# Patient Record
Sex: MALE | Race: WHITE | NOT HISPANIC OR LATINO
[De-identification: names, ages, dates, MRNs, and addresses within clinical notes are randomized per-mention and may not be internally consistent; named-entity substitution may affect disease eponyms.]

---

## 2021-05-15 ENCOUNTER — TRANSCRIPTION ENCOUNTER (OUTPATIENT)
Age: 71
End: 2021-05-15

## 2023-12-12 ENCOUNTER — EMERGENCY (EMERGENCY)
Facility: HOSPITAL | Age: 73
LOS: 1 days | Discharge: HOME | End: 2023-12-12
Attending: EMERGENCY MEDICINE
Payer: MEDICARE

## 2023-12-12 VITALS
SYSTOLIC BLOOD PRESSURE: 198 MMHG | WEIGHT: 173.06 LBS | DIASTOLIC BLOOD PRESSURE: 109 MMHG | HEIGHT: 69 IN | RESPIRATION RATE: 17 BRPM | TEMPERATURE: 98 F | OXYGEN SATURATION: 97 % | HEART RATE: 81 BPM

## 2023-12-12 VITALS
SYSTOLIC BLOOD PRESSURE: 177 MMHG | OXYGEN SATURATION: 100 % | RESPIRATION RATE: 18 BRPM | DIASTOLIC BLOOD PRESSURE: 93 MMHG | HEART RATE: 83 BPM

## 2023-12-12 LAB
ALBUMIN SERPL ELPH-MCNC: 4.4 G/DL — SIGNIFICANT CHANGE UP (ref 3.3–5)
ALBUMIN SERPL ELPH-MCNC: 4.4 G/DL — SIGNIFICANT CHANGE UP (ref 3.3–5)
ALP SERPL-CCNC: 60 U/L — SIGNIFICANT CHANGE UP (ref 40–120)
ALP SERPL-CCNC: 60 U/L — SIGNIFICANT CHANGE UP (ref 40–120)
ALT FLD-CCNC: 16 U/L — SIGNIFICANT CHANGE UP (ref 10–45)
ALT FLD-CCNC: 16 U/L — SIGNIFICANT CHANGE UP (ref 10–45)
ANION GAP SERPL CALC-SCNC: 11 MMOL/L — SIGNIFICANT CHANGE UP (ref 5–17)
ANION GAP SERPL CALC-SCNC: 11 MMOL/L — SIGNIFICANT CHANGE UP (ref 5–17)
APTT BLD: 36.6 SEC — HIGH (ref 24.5–35.6)
APTT BLD: 36.6 SEC — HIGH (ref 24.5–35.6)
AST SERPL-CCNC: 22 U/L — SIGNIFICANT CHANGE UP (ref 10–40)
AST SERPL-CCNC: 22 U/L — SIGNIFICANT CHANGE UP (ref 10–40)
BASOPHILS # BLD AUTO: 0.03 K/UL — SIGNIFICANT CHANGE UP (ref 0–0.2)
BASOPHILS # BLD AUTO: 0.03 K/UL — SIGNIFICANT CHANGE UP (ref 0–0.2)
BASOPHILS NFR BLD AUTO: 0.5 % — SIGNIFICANT CHANGE UP (ref 0–2)
BASOPHILS NFR BLD AUTO: 0.5 % — SIGNIFICANT CHANGE UP (ref 0–2)
BILIRUB SERPL-MCNC: 1.1 MG/DL — SIGNIFICANT CHANGE UP (ref 0.2–1.2)
BILIRUB SERPL-MCNC: 1.1 MG/DL — SIGNIFICANT CHANGE UP (ref 0.2–1.2)
BUN SERPL-MCNC: 18 MG/DL — SIGNIFICANT CHANGE UP (ref 7–23)
BUN SERPL-MCNC: 18 MG/DL — SIGNIFICANT CHANGE UP (ref 7–23)
CALCIUM SERPL-MCNC: 9.5 MG/DL — SIGNIFICANT CHANGE UP (ref 8.4–10.5)
CALCIUM SERPL-MCNC: 9.5 MG/DL — SIGNIFICANT CHANGE UP (ref 8.4–10.5)
CHLORIDE SERPL-SCNC: 103 MMOL/L — SIGNIFICANT CHANGE UP (ref 96–108)
CHLORIDE SERPL-SCNC: 103 MMOL/L — SIGNIFICANT CHANGE UP (ref 96–108)
CO2 SERPL-SCNC: 27 MMOL/L — SIGNIFICANT CHANGE UP (ref 22–31)
CO2 SERPL-SCNC: 27 MMOL/L — SIGNIFICANT CHANGE UP (ref 22–31)
CREAT SERPL-MCNC: 0.84 MG/DL — SIGNIFICANT CHANGE UP (ref 0.5–1.3)
CREAT SERPL-MCNC: 0.84 MG/DL — SIGNIFICANT CHANGE UP (ref 0.5–1.3)
CRP SERPL-MCNC: <3 MG/L — SIGNIFICANT CHANGE UP (ref 0–4)
CRP SERPL-MCNC: <3 MG/L — SIGNIFICANT CHANGE UP (ref 0–4)
EGFR: 92 ML/MIN/1.73M2 — SIGNIFICANT CHANGE UP
EGFR: 92 ML/MIN/1.73M2 — SIGNIFICANT CHANGE UP
EOSINOPHIL # BLD AUTO: 0.08 K/UL — SIGNIFICANT CHANGE UP (ref 0–0.5)
EOSINOPHIL # BLD AUTO: 0.08 K/UL — SIGNIFICANT CHANGE UP (ref 0–0.5)
EOSINOPHIL NFR BLD AUTO: 1.3 % — SIGNIFICANT CHANGE UP (ref 0–6)
EOSINOPHIL NFR BLD AUTO: 1.3 % — SIGNIFICANT CHANGE UP (ref 0–6)
ERYTHROCYTE [SEDIMENTATION RATE] IN BLOOD: 32 MM/HR — HIGH (ref 0–20)
ERYTHROCYTE [SEDIMENTATION RATE] IN BLOOD: 32 MM/HR — HIGH (ref 0–20)
GLUCOSE SERPL-MCNC: 91 MG/DL — SIGNIFICANT CHANGE UP (ref 70–99)
GLUCOSE SERPL-MCNC: 91 MG/DL — SIGNIFICANT CHANGE UP (ref 70–99)
HCT VFR BLD CALC: 42.1 % — SIGNIFICANT CHANGE UP (ref 39–50)
HCT VFR BLD CALC: 42.1 % — SIGNIFICANT CHANGE UP (ref 39–50)
HGB BLD-MCNC: 14.1 G/DL — SIGNIFICANT CHANGE UP (ref 13–17)
HGB BLD-MCNC: 14.1 G/DL — SIGNIFICANT CHANGE UP (ref 13–17)
IMM GRANULOCYTES NFR BLD AUTO: 0.3 % — SIGNIFICANT CHANGE UP (ref 0–0.9)
IMM GRANULOCYTES NFR BLD AUTO: 0.3 % — SIGNIFICANT CHANGE UP (ref 0–0.9)
INR BLD: 2.05 RATIO — HIGH (ref 0.85–1.18)
INR BLD: 2.05 RATIO — HIGH (ref 0.85–1.18)
LYMPHOCYTES # BLD AUTO: 0.67 K/UL — LOW (ref 1–3.3)
LYMPHOCYTES # BLD AUTO: 0.67 K/UL — LOW (ref 1–3.3)
LYMPHOCYTES # BLD AUTO: 10.9 % — LOW (ref 13–44)
LYMPHOCYTES # BLD AUTO: 10.9 % — LOW (ref 13–44)
MCHC RBC-ENTMCNC: 31.1 PG — SIGNIFICANT CHANGE UP (ref 27–34)
MCHC RBC-ENTMCNC: 31.1 PG — SIGNIFICANT CHANGE UP (ref 27–34)
MCHC RBC-ENTMCNC: 33.5 GM/DL — SIGNIFICANT CHANGE UP (ref 32–36)
MCHC RBC-ENTMCNC: 33.5 GM/DL — SIGNIFICANT CHANGE UP (ref 32–36)
MCV RBC AUTO: 92.7 FL — SIGNIFICANT CHANGE UP (ref 80–100)
MCV RBC AUTO: 92.7 FL — SIGNIFICANT CHANGE UP (ref 80–100)
MONOCYTES # BLD AUTO: 0.42 K/UL — SIGNIFICANT CHANGE UP (ref 0–0.9)
MONOCYTES # BLD AUTO: 0.42 K/UL — SIGNIFICANT CHANGE UP (ref 0–0.9)
MONOCYTES NFR BLD AUTO: 6.9 % — SIGNIFICANT CHANGE UP (ref 2–14)
MONOCYTES NFR BLD AUTO: 6.9 % — SIGNIFICANT CHANGE UP (ref 2–14)
NEUTROPHILS # BLD AUTO: 4.91 K/UL — SIGNIFICANT CHANGE UP (ref 1.8–7.4)
NEUTROPHILS # BLD AUTO: 4.91 K/UL — SIGNIFICANT CHANGE UP (ref 1.8–7.4)
NEUTROPHILS NFR BLD AUTO: 80.1 % — HIGH (ref 43–77)
NEUTROPHILS NFR BLD AUTO: 80.1 % — HIGH (ref 43–77)
NRBC # BLD: 0 /100 WBCS — SIGNIFICANT CHANGE UP (ref 0–0)
NRBC # BLD: 0 /100 WBCS — SIGNIFICANT CHANGE UP (ref 0–0)
PLATELET # BLD AUTO: 249 K/UL — SIGNIFICANT CHANGE UP (ref 150–400)
PLATELET # BLD AUTO: 249 K/UL — SIGNIFICANT CHANGE UP (ref 150–400)
POTASSIUM SERPL-MCNC: 4.3 MMOL/L — SIGNIFICANT CHANGE UP (ref 3.5–5.3)
POTASSIUM SERPL-MCNC: 4.3 MMOL/L — SIGNIFICANT CHANGE UP (ref 3.5–5.3)
POTASSIUM SERPL-SCNC: 4.3 MMOL/L — SIGNIFICANT CHANGE UP (ref 3.5–5.3)
POTASSIUM SERPL-SCNC: 4.3 MMOL/L — SIGNIFICANT CHANGE UP (ref 3.5–5.3)
PROT SERPL-MCNC: 7.3 G/DL — SIGNIFICANT CHANGE UP (ref 6–8.3)
PROT SERPL-MCNC: 7.3 G/DL — SIGNIFICANT CHANGE UP (ref 6–8.3)
PROTHROM AB SERPL-ACNC: 21.1 SEC — HIGH (ref 9.5–13)
PROTHROM AB SERPL-ACNC: 21.1 SEC — HIGH (ref 9.5–13)
RBC # BLD: 4.54 M/UL — SIGNIFICANT CHANGE UP (ref 4.2–5.8)
RBC # BLD: 4.54 M/UL — SIGNIFICANT CHANGE UP (ref 4.2–5.8)
RBC # FLD: 13.2 % — SIGNIFICANT CHANGE UP (ref 10.3–14.5)
RBC # FLD: 13.2 % — SIGNIFICANT CHANGE UP (ref 10.3–14.5)
SODIUM SERPL-SCNC: 141 MMOL/L — SIGNIFICANT CHANGE UP (ref 135–145)
SODIUM SERPL-SCNC: 141 MMOL/L — SIGNIFICANT CHANGE UP (ref 135–145)
WBC # BLD: 6.13 K/UL — SIGNIFICANT CHANGE UP (ref 3.8–10.5)
WBC # BLD: 6.13 K/UL — SIGNIFICANT CHANGE UP (ref 3.8–10.5)
WBC # FLD AUTO: 6.13 K/UL — SIGNIFICANT CHANGE UP (ref 3.8–10.5)
WBC # FLD AUTO: 6.13 K/UL — SIGNIFICANT CHANGE UP (ref 3.8–10.5)

## 2023-12-12 PROCEDURE — 73610 X-RAY EXAM OF ANKLE: CPT | Mod: 26,RT

## 2023-12-12 PROCEDURE — 85730 THROMBOPLASTIN TIME PARTIAL: CPT

## 2023-12-12 PROCEDURE — 73630 X-RAY EXAM OF FOOT: CPT | Mod: 26,RT

## 2023-12-12 PROCEDURE — 85652 RBC SED RATE AUTOMATED: CPT

## 2023-12-12 PROCEDURE — 85610 PROTHROMBIN TIME: CPT

## 2023-12-12 PROCEDURE — 73610 X-RAY EXAM OF ANKLE: CPT

## 2023-12-12 PROCEDURE — 80053 COMPREHEN METABOLIC PANEL: CPT

## 2023-12-12 PROCEDURE — 85025 COMPLETE CBC W/AUTO DIFF WBC: CPT

## 2023-12-12 PROCEDURE — 73630 X-RAY EXAM OF FOOT: CPT

## 2023-12-12 PROCEDURE — 86140 C-REACTIVE PROTEIN: CPT

## 2023-12-12 PROCEDURE — 36415 COLL VENOUS BLD VENIPUNCTURE: CPT

## 2023-12-12 PROCEDURE — 99284 EMERGENCY DEPT VISIT MOD MDM: CPT | Mod: 25

## 2023-12-12 PROCEDURE — 99284 EMERGENCY DEPT VISIT MOD MDM: CPT | Mod: GC

## 2023-12-12 RX ORDER — MUPIROCIN 20 MG/G
1 OINTMENT TOPICAL
Refills: 0
Start: 2023-12-12

## 2023-12-12 RX ORDER — MUPIROCIN 20 MG/G
1 OINTMENT TOPICAL
Qty: 1 | Refills: 0
Start: 2023-12-12 | End: 2023-12-21

## 2023-12-12 RX ORDER — MUPIROCIN 20 MG/G
1 OINTMENT TOPICAL ONCE
Refills: 0 | Status: COMPLETED | OUTPATIENT
Start: 2023-12-12 | End: 2023-12-12

## 2023-12-12 RX ADMIN — MUPIROCIN 1 APPLICATION(S): 20 OINTMENT TOPICAL at 17:00

## 2023-12-12 NOTE — ED ADULT NURSE NOTE - OBJECTIVE STATEMENT
Pt is a 73y M, AxO3, PMH DVT, presents to ED for R LE wound. Pt states he first noted leaking wound in August, and has been following up outpatient. On weds, pt saw outpatient MD because wound was oozing yellow serous fluid, but in the last 4 days wound has been draining increasingly more fluid that is clear with a foul odor. He did not take the outpatient prescribed antibiotics, and came to ED for further eval.  Endorsing intermittent tingling and pain. Leg is dusky and scaled up to calf level, wound is localized to medial heel and ankle, moistened with clear fluid. Motor and sensation to toes and foot intact, area is warm, pedal pulses present. Breathing spontaneous and unlabored. Denies SOB, chest pain,  symptoms, N/V. Well appearing, ambulatory at bedside, VSS, no acute distress. Pt is a 73y M, AxO3, PMH R LE DVT on warfarin, presents to ED for R LE wound. Pt states he first noted leaking wound in August, and has been following up outpatient. On weds, pt saw outpatient MD because wound was oozing yellow serous fluid, but in the last 4 days wound has been draining increasingly more fluid that is clear with a foul odor. He did not take the outpatient prescribed antibiotics, and came to ED for further eval.  Endorsing intermittent tingling and pain. Leg is dusky and scaled up to calf level, wound is localized to medial heel and ankle, moistened with clear fluid. Motor and sensation to toes and foot intact, area is warm, pedal pulses present. Breathing spontaneous and unlabored. Denies fevers, chills, SOB, chest pain,  symptoms, N/V. Well appearing, ambulatory at bedside, VSS, no acute distress.

## 2023-12-12 NOTE — ED ADULT NURSE NOTE - NSFALLUNIVINTERV_ED_ALL_ED
Bed/Stretcher in lowest position, wheels locked, appropriate side rails in place/Call bell, personal items and telephone in reach/Instruct patient to call for assistance before getting out of bed/chair/stretcher/Non-slip footwear applied when patient is off stretcher/[place] to call system/Physically safe environment - no spills, clutter or unnecessary equipment/Purposeful proactive rounding/Room/bathroom lighting operational, light cord in reach Bed/Stretcher in lowest position, wheels locked, appropriate side rails in place/Call bell, personal items and telephone in reach/Instruct patient to call for assistance before getting out of bed/chair/stretcher/Non-slip footwear applied when patient is off stretcher/[place] to call system/Physically safe environment - no spills, clutter or unnecessary equipment/Purposeful proactive rounding/Room/bathroom lighting operational, light cord in reach

## 2023-12-12 NOTE — ED PROVIDER NOTE - OBJECTIVE STATEMENT
73-year-old male present to the emergency department due to nonhealing wound on right leg since August.  The patient states that started on the inner aspect of the foot and has progressed to the point where now it is oozing yellow watery discharge that is a foul odor.  He states that the wheezing started this past Wednesday, and the discharge has become heavier since Friday.  He went to his primary care doctor last week who prescribed him doxycycline, but he did not take the medication.  Patient has been wrapping it and keeping it clean since it is started, denies any purulent discharge or bleeding.  Patient denies any numbness to the foot, inability to walk, pain to the area, fevers, chills, body aches, history of diabetes, history of peripheral vascular disease, history of smoking. 73-year-old male present to the emergency department due to nonhealing wound on right leg since August.  The patient states that started on the inner aspect of the foot and has progressed to the point where now it is oozing yellow watery discharge that is a foul odor.  He states that the oozing started this past Wednesday, and the discharge has become heavier since Friday.  He went to his primary care doctor last week who prescribed him doxycycline, but he did not take the medication.  Patient has been wrapping it and keeping it clean since it is started, denies any purulent discharge or bleeding.  Patient denies any numbness to the foot, inability to walk, pain to the area, fevers, chills, body aches, history of diabetes, history of peripheral vascular disease, history of smoking.

## 2023-12-12 NOTE — ED PROVIDER NOTE - PROGRESS NOTE DETAILS
I spoke with the patient and discussed with him the recommendations from podiatry.  He agreed to follow-up with his primary care doctor in 3 days for repeat INR due to being given doxycycline, and he agreed to follow-up with podiatry in 1 week for further evaluation.  I instructed him to take the doxycycline twice a day for 10 days, and I reinforced instructions for podiatry to change the bandages and clean the area with mupirocin and Betadine daily.  I gave the patient instructions to return to the ED if symptoms worsen or develop symptoms such as fevers, chills, body aches, cough, chest pain.    Sal Perez MD (PGY 1)

## 2023-12-12 NOTE — CONSULT NOTE ADULT - SUBJECTIVE AND OBJECTIVE BOX
Chief Complaint: wound penetrating and wound leaking.    · Chief Complaint: The patient is a 73y Male complaining of wound penetrating.  · HPI Objective Statement: 73-year-old male present to the emergency department due to nonhealing wound on right leg since August.  The patient states that started on the inner aspect of the foot and has progressed to the point where now it is oozing yellow watery discharge that is a foul odor.  He states that the oozing started this past Wednesday, and the discharge has become heavier since Friday.  He went to his primary care doctor last week who prescribed him doxycycline, but he did not take the medication.  Patient has been wrapping it and keeping it clean since it is started, denies any purulent discharge or bleeding.  Patient denies any numbness to the foot, inability to walk, pain to the area, fevers, chills, body aches, history of diabetes, history of peripheral vascular disease, history of smoking.    PAST MEDICAL & SURGICAL HISTORY:  DVT (deep venous thrombosis)      Cataract          MEDICATIONS  (STANDING):    MEDICATIONS  (PRN):      Allergies    No Known Allergies    Intolerances        VITALS:    Vital Signs Last 24 Hrs  T(C): 36.4 (12 Dec 2023 11:12), Max: 36.4 (12 Dec 2023 11:12)  T(F): 97.6 (12 Dec 2023 11:12), Max: 97.6 (12 Dec 2023 11:12)  HR: 83 (12 Dec 2023 12:10) (81 - 83)  BP: 177/93 (12 Dec 2023 12:10) (177/93 - 198/109)  BP(mean): --  RR: 18 (12 Dec 2023 12:10) (17 - 18)  SpO2: 100% (12 Dec 2023 12:10) (97% - 100%)    Parameters below as of 12 Dec 2023 12:10  Patient On (Oxygen Delivery Method): room air        LABS:                          14.1   6.13  )-----------( 249      ( 12 Dec 2023 13:09 )             42.1       12-12    141  |  103  |  18  ----------------------------<  91  4.3   |  27  |  0.84    Ca    9.5      12 Dec 2023 13:09    TPro  7.3  /  Alb  4.4  /  TBili  1.1  /  DBili  x   /  AST  22  /  ALT  16  /  AlkPhos  60  12-12      CAPILLARY BLOOD GLUCOSE              LOWER EXTREMITY PHYSICAL EXAM:    Vascular: DP/PT 2/4, B/L, CFT <3 seconds B/L, Temperature gradient warm to warm R, warm to cool L.   Neuro: Epicritic sensation diminished to the level of digits, B/L.  Musculoskeletal/Ortho: unremarkable  Skin: b/l lower extremity skin pigmentation, extend from dorsolateral foot to the proximal 1/3 leg, R lower extremity 3+ pitting edema extend from toes to the level of midcalf, R medial heel wound to dermis, with periwound erythema and skin maceration, mild malodor with serous fluid oozing. L foot no open wounds, no acute signs of infection.    RADIOLOGY & ADDITIONAL STUDIES:  < from: Xray Ankle Complete 3 Views, Right (12.12.23 @ 13:10) >    ACC: [id number] EXAM:  XR ANKLE COMP MIN 3 VIEWS RT   ORDERED BY:  PELON POTTER     ACC: [id number] EXAM:  XR FOOT COMP MIN 3 VIEWS RT   ORDERED BY:  PELON POTTER     PROCEDURE DATE:  12/12/2023          INTERPRETATION:  XR FOOT COMPLETE 3 VIEWS RIGHT, XR ANKLE COMPLETE 3   VIEWS RIGHT    HISTORY: Cellulitis. Concern for osteolysis.    VIEWS: 3 views each  IMAGES: 6    COMPARISON: Right ankle x-rays dated 9/27/2016.    FINDINGS:    OSSEOUS STRUCTURES    Fractures:  None.    JOINTS    Joint Space(s):  There is moderate narrowing of the 1st   metatarsal-phalangeal joint with small to moderate-sized osteophytes.    OTHER FINDINGS:  Moderate atherosclerotic ossifications are present.   Subcutaneous edema is noted. No gas is seen. Plantar and retrocalcaneal   enthesophytes are noted.    IMPRESSION:  1.  No osseous destruction or soft tissue gas is seen.    --- End of Report ---            SUDHIR MORENO MD; Attending Radiologist  This document has been electronically signed. Dec 12 2023  1:44PM    < end of copied text >     Chief Complaint: wound penetrating and wound leaking.    · Chief Complaint: The patient is a 73y Male complaining of wound penetrating.  · HPI Objective Statement: 73-year-old male present to the emergency department due to nonhealing wound on right leg since August.  The patient states that started on the inner aspect of the foot and has progressed to the point where now it is oozing yellow watery discharge that is a foul odor.  He states that the oozing started this past Wednesday, and the discharge has become heavier since Friday.  He went to his primary care doctor last week who prescribed him doxycycline, but he did not take the medication.  Patient has been wrapping it and keeping it clean since it is started, denies any purulent discharge or bleeding.  Patient denies any numbness to the foot, inability to walk, pain to the area, fevers, chills, body aches, history of diabetes, history of peripheral vascular disease, history of smoking.    PAST MEDICAL & SURGICAL HISTORY:  DVT (deep venous thrombosis)      Cataract          MEDICATIONS  (STANDING):    MEDICATIONS  (PRN):      Allergies    No Known Allergies    Intolerances        VITALS:    Vital Signs Last 24 Hrs  T(C): 36.4 (12 Dec 2023 11:12), Max: 36.4 (12 Dec 2023 11:12)  T(F): 97.6 (12 Dec 2023 11:12), Max: 97.6 (12 Dec 2023 11:12)  HR: 83 (12 Dec 2023 12:10) (81 - 83)  BP: 177/93 (12 Dec 2023 12:10) (177/93 - 198/109)  BP(mean): --  RR: 18 (12 Dec 2023 12:10) (17 - 18)  SpO2: 100% (12 Dec 2023 12:10) (97% - 100%)    Parameters below as of 12 Dec 2023 12:10  Patient On (Oxygen Delivery Method): room air        LABS:                          14.1   6.13  )-----------( 249      ( 12 Dec 2023 13:09 )             42.1       12-12    141  |  103  |  18  ----------------------------<  91  4.3   |  27  |  0.84    Ca    9.5      12 Dec 2023 13:09    TPro  7.3  /  Alb  4.4  /  TBili  1.1  /  DBili  x   /  AST  22  /  ALT  16  /  AlkPhos  60  12-12      CAPILLARY BLOOD GLUCOSE              LOWER EXTREMITY PHYSICAL EXAM:    Vascular: DP/PT 2/4, B/L, CFT <3 seconds B/L, Temperature gradient warm to warm R, warm to cool L.   Neuro: Epicritic sensation diminished to the level of digits, B/L.  Musculoskeletal/Ortho: unremarkable  Skin: b/l lower extremity skin pigmentation, extend from dorsolateral foot to the proximal 1/3 leg, R lower extremity 3+ pitting edema extend from toes to the level of midcalf, R medial heel wound to dermis, with periwound erythema and skin maceration, mild malodor with serous fluid oozing. L foot no open wounds, no acute signs of infection.    RADIOLOGY & ADDITIONAL STUDIES:  < from: Xray Ankle Complete 3 Views, Right (12.12.23 @ 13:10) >    ACC: [id number] EXAM:  XR ANKLE COMP MIN 3 VIEWS RT   ORDERED BY:  PELON POTTER     ACC: [id number] EXAM:  XR FOOT COMP MIN 3 VIEWS RT   ORDERED BY:  PELON POTTER     PROCEDURE DATE:  12/12/2023          INTERPRETATION:  XR FOOT COMPLETE 3 VIEWS RIGHT, XR ANKLE COMPLETE 3   VIEWS RIGHT    HISTORY: Cellulitis. Concern for osteolysis.    VIEWS: 3 views each  IMAGES: 6    COMPARISON: Right ankle x-rays dated 9/27/2016.    FINDINGS:    OSSEOUS STRUCTURES    Fractures:  None.    JOINTS    Joint Space(s):  There is moderate narrowing of the 1st   metatarsal-phalangeal joint with small to moderate-sized osteophytes.    OTHER FINDINGS:  Moderate atherosclerotic ossifications are present.   Subcutaneous edema is noted. No gas is seen. Plantar and retrocalcaneal   enthesophytes are noted.    IMPRESSION:  1.  No osseous destruction or soft tissue gas is seen.    --- End of Report ---            SUDHIR MORENO MD; Attending Radiologist  This document has been electronically signed. Dec 12 2023  1:44PM    < end of copied text >

## 2023-12-12 NOTE — ED PROVIDER NOTE - PATIENT PORTAL LINK FT
You can access the FollowMyHealth Patient Portal offered by [facility] by registering at the following website: [email or web address]. By joining [employer]’s FollowMyHealth portal, you will also be able to view your health information using other applications (apps) compatible with our system. You can access the FollowMyHealth Patient Portal offered by [facility] by registering at the following website: [email or web address]. By joining [employer]’s FollowMyHealth portal, you will also be able to view your health information using other applications (apps) compatible with our system.

## 2023-12-12 NOTE — ED PROVIDER NOTE - ATTENDING CONTRIBUTION TO CARE
This is a 78-year-old fellow with right-sided foot redness and oozing.  He was seen by his regular doctor who sent him to the hospital.  No fevers or chills.  No trauma or injury recently however few months ago he did have a trauma.  His doctor prescribed antibiotics but he chose not to use it.  He has a history of DVT although he is taking Coumadin at this time.  2+ dorsalis pedis pulse.  The second toe is red and slightly warm with no pain to touch.  Normal sensation.  The medial aspect from the mid ankle into the arch is red warm with raw skin and minimal tenderness.  There is no induration or fluctuance.  I am unclear if the wound that he has on the medial aspect is cellulitic or simply stasis related.  The second toe does appear to have cellulitis.  At this time will do x-ray and consult podiatry for further evaluation and treatment.  CBC CMP reassess the patient.  Patient likely will require antibiotics either IV or p.o.

## 2023-12-12 NOTE — ED PROVIDER NOTE - NSFOLLOWUPINSTRUCTIONS_ED_ALL_ED_FT
Cellulitis    Cellulitis is a skin infection caused by bacteria. This condition occurs most often in the arms and lower legs but can occur anywhere over the body. Symptoms include redness, swelling, warm skin, tenderness, and chills/fever. If you were prescribed an antibiotic medicine, take it as told by your health care provider. Do not stop taking the antibiotic even if you start to feel better.    SEEK IMMEDIATE MEDICAL CARE IF YOU HAVE ANY OF THE FOLLOWING SYMPTOMS: worsening fever, red streaks coming from affected area, vomiting or diarrhea, or dizziness/lightheadedness.      - Recommended doxycyline for 10 days  - weight bearing as tolerated in a surgical shoe  - Recommended daily dressing change with Mupirocin cream followed by 4x4 gauze and ace banadage  - Pt instructed to return to ED immediately if erythema of the right lower extremity persists or worsen.  - Please call [phone] within a week to make an appointment with Dr. Casillas   - Follow up with your PCP in 3 days for repeat INR Cellulitis    Cellulitis is a skin infection caused by bacteria. This condition occurs most often in the arms and lower legs but can occur anywhere over the body. Symptoms include redness, swelling, warm skin, tenderness, and chills/fever. If you were prescribed an antibiotic medicine, take it as told by your health care provider. Do not stop taking the antibiotic even if you start to feel better.    SEEK IMMEDIATE MEDICAL CARE IF YOU HAVE ANY OF THE FOLLOWING SYMPTOMS: worsening fever, red streaks coming from affected area, vomiting or diarrhea, or dizziness/lightheadedness.      - Recommended doxycyline for 10 days  - weight bearing as tolerated in a surgical shoe  - Recommended daily dressing change with Mupirocin cream followed by 4x4 gauze and ace banadage  - Pt instructed to return to ED immediately if erythema of the right lower extremity persists or worsen.  - Please call [phone] within a week to make an appointment with Dr. Casillas   - Follow up with your PCP in 3 days for repeat INR

## 2023-12-12 NOTE — ED PROVIDER NOTE - CLINICAL SUMMARY MEDICAL DECISION MAKING FREE TEXT BOX
73-year-old male past medical history of DVT presenting to emergency department due to nonhealing wounds on right foot.  Wounds are located on medial aspect of right foot and on the second digit of the right toe.  Patient states the wound started in August and as of the past week the discharge has worsened.  He was offered doxycycline but did not take its.  Patient denies any fevers, chills, body aches, pain to area, numbness or weakness to area.  Patient is able to walk on the leg.  On exam there are 2 nonhealing wounds 1 on the medial aspect of the right foot and 1 on the second digit of the right foot.  Serous discharge is present with no purulent or sanguinous discharge present.  Surrounding area is dark in coloration patient has complete sensation and movement of foot with equal pulses to the lower extremities bilaterally.  Will order CBC, CMP, PT/PTT/INR, ESR, CRP and x-ray right foot to evaluate for any signs of infection or osteomyelitis.  Will consult podiatry.

## 2023-12-12 NOTE — CONSULT NOTE ADULT - ASSESSMENT
72 yo M presented with R lower extremity venous stasis with superimposed celluitis  - Pt seen and evaluated.  - Afebrile, WBC 6.13, ESR ordered, CRP <0.3  - b/l lower extremity skin pigmentation, extend from dorsolateral foot to the proximal 1/3 leg, R lower extremity 3+ pitting edema extend from toes to the level of midcalf, R medial heel wound to dermis, with periwound erythema and skin maceration, mild malodor with serous fluid oozing. L foot no open wounds, no acute signs of infection.  - RLE xr: no OM, no gas  - Right foot wound culture not obtained 2/2 absence of culturable material  - Applied betadine followed by 4x4 gauze, rubio and ace banadage  - Recommended doxycyline for 10 days  - weight bearing as tolerated in a surgical shoe  - Recommended daily dressing change with Mupirocin cream followed by 4x4 gauze and ace banadage  - Pt instructed to return to ED immediately if erythema of the right lower extremity persists or worsen.  - Please call [phone] within a week to make an appointment with Dr. Casillas   - Discussed with Attending  74 yo M presented with R lower extremity venous stasis with superimposed celluitis  - Pt seen and evaluated.  - Afebrile, WBC 6.13, ESR ordered, CRP <0.3  - b/l lower extremity skin pigmentation, extend from dorsolateral foot to the proximal 1/3 leg, R lower extremity 3+ pitting edema extend from toes to the level of midcalf, R medial heel wound to dermis, with periwound erythema and skin maceration, mild malodor with serous fluid oozing. L foot no open wounds, no acute signs of infection.  - RLE xr: no OM, no gas  - Right foot wound culture not obtained 2/2 absence of culturable material  - Applied betadine followed by 4x4 gauze, rubio and ace banadage  - Recommended doxycyline for 10 days  - weight bearing as tolerated in a surgical shoe  - Recommended daily dressing change with Mupirocin cream followed by 4x4 gauze and ace banadage  - Pt instructed to return to ED immediately if erythema of the right lower extremity persists or worsen.  - Please call [phone] within a week to make an appointment with Dr. Casillas   - Discussed with Attending

## 2024-01-05 ENCOUNTER — APPOINTMENT (OUTPATIENT)
Dept: VASCULAR SURGERY | Facility: CLINIC | Age: 74
End: 2024-01-05
Payer: MEDICARE

## 2024-01-05 VITALS
HEIGHT: 69 IN | TEMPERATURE: 98 F | BODY MASS INDEX: 25.62 KG/M2 | WEIGHT: 173 LBS | DIASTOLIC BLOOD PRESSURE: 101 MMHG | SYSTOLIC BLOOD PRESSURE: 178 MMHG | HEART RATE: 75 BPM

## 2024-01-05 DIAGNOSIS — I82.401 ACUTE EMBOLISM AND THROMBOSIS OF UNSPECIFIED DEEP VEINS OF RIGHT LOWER EXTREMITY: ICD-10-CM

## 2024-01-05 PROCEDURE — 93922 UPR/L XTREMITY ART 2 LEVELS: CPT

## 2024-01-05 PROCEDURE — 99204 OFFICE O/P NEW MOD 45 MIN: CPT | Mod: 25

## 2024-01-05 PROCEDURE — 29580 STRAPPING UNNA BOOT: CPT | Mod: RT

## 2024-01-05 PROCEDURE — 93971 EXTREMITY STUDY: CPT | Mod: RT

## 2024-01-05 RX ORDER — ACETAMINOPHEN 325 MG
TABLET ORAL
Refills: 0 | Status: ACTIVE | COMMUNITY

## 2024-01-05 RX ORDER — MULTIVIT-MIN/IRON/FOLIC ACID/K 18-600-40
CAPSULE ORAL
Refills: 0 | Status: ACTIVE | COMMUNITY

## 2024-01-08 NOTE — ASSESSMENT
[FreeTextEntry1] : 73M history RLE DVT on warfarin presenting with progressive R pain, erythema, and edema refractory to local wound care received thus far. Duplex in office shows chronic RLE DVT. RONY's noncompressible with good waveforms.   Plan for [place] wound care. Follow up in office on Tuesday 1/9 for local wound care.

## 2024-01-08 NOTE — PHYSICAL EXAM
[Normal Rate and Rhythm] : normal rate and rhythm [2+] : left 2+ [Ankle Swelling On The Right] : of the right ankle [] : of the right leg [de-identified] : well appearing [FreeTextEntry1] : palpable femoral areteries bilaterally, R PT/DP not palpable due to significant edema, R erythema and edema of first and second toes, and heel, venous stasis dermatitis of right medial leg, LLE unremarkable no wounds no skin changes no edema [de-identified] : sensation and motor grossly intact in all extremities

## 2024-01-08 NOTE — END OF VISIT
[] : Resident [FreeTextEntry3] : non invasives appear show adequate arterial perfusion. pt w venous hypertension, chronic dvt, post thrombotic syndrome  medial ankle cleansed, unna, foam, compression applied [Time Spent: ___ minutes] : I have spent [unfilled] minutes of time on the encounter.

## 2024-01-08 NOTE — HISTORY OF PRESENT ILLNESS
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 He reports remote history of trauma to right heel which required local wound care by his podiatrist which eventually resolved, and similar history of right toe wound requiring local wound care by podiatry which eventually resolved. Patient now presents with right foot pain, erythema, and swelling. This first began back in June of this year at which point he noticed right foot pain while gardening, associated with weeping edema from R foot/ankle. Since then he has been intermittently seen by podiatry, completed a course of mupirocin/doxycycline, and has had intermittent local wound care. He was last seen by Podiatry two weeks ago at which point local wound care was provided and he was referred to Vascular surgery. The pain is localized to the R foot and ankle, with no aggravating or alleviating factors. He denies fevers, chills. He previously wore compression stockings but is now unable to due to the extent of the edema.

## 2024-01-26 ENCOUNTER — APPOINTMENT (OUTPATIENT)
Dept: VASCULAR SURGERY | Facility: CLINIC | Age: 74
End: 2024-01-26
Payer: MEDICARE

## 2024-01-26 VITALS
SYSTOLIC BLOOD PRESSURE: 152 MMHG | TEMPERATURE: 98.1 F | HEART RATE: 69 BPM | WEIGHT: 173 LBS | BODY MASS INDEX: 25.62 KG/M2 | HEIGHT: 69 IN | DIASTOLIC BLOOD PRESSURE: 74 MMHG

## 2024-01-26 PROCEDURE — 29580 STRAPPING UNNA BOOT: CPT | Mod: RT

## 2024-01-26 NOTE — HISTORY OF PRESENT ILLNESS
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 He reports remote history of trauma to right heel which required local wound care by his podiatrist which eventually resolved, and similar history of right toe wound requiring local wound care by podiatry which eventually resolved. Patient now presents with right foot pain, erythema, and swelling. This first began back in June of this year at which point he noticed right foot pain while gardening, associated with weeping edema from R foot/ankle. Since then he has been intermittently seen by podiatry, completed a course of mupirocin/doxycycline, and has had intermittent local wound care. He was last seen by Podiatry two weeks ago at which point local wound care was provided and he was referred to Vascular surgery. The pain is localized to the R foot and ankle, with no aggravating or alleviating factors. He denies fevers, chills. He previously wore compression stockings but is now unable to due to the extent of the edema.  [de-identified] : 1/26/24: pt for fu.  he had his bandage changed at podiatry earlier in the week.  pt notices improvement

## 2024-01-26 NOTE — PHYSICAL EXAM
[de-identified] : well appearing.   Cardiovascular: normal rate and rhythm. Femoral: right 2+ and left 2+. Palpable femoral areteries bilaterally, R PT/DP not palpable due to significant edema, R erythema and edema of first and second toes, and heel, venous stasis dermatitis of right medial leg, LLE unremarkable no wounds no skin changes no edema.   ankle edema of the right ankle   venous stasis of the right leg   Musculoskeletal: sensation and motor grossly intact in all extremities.

## 2024-02-02 ENCOUNTER — APPOINTMENT (OUTPATIENT)
Dept: VASCULAR SURGERY | Facility: CLINIC | Age: 74
End: 2024-02-02
Payer: MEDICARE

## 2024-02-02 VITALS
HEIGHT: 69 IN | HEART RATE: 68 BPM | TEMPERATURE: 97.6 F | DIASTOLIC BLOOD PRESSURE: 81 MMHG | SYSTOLIC BLOOD PRESSURE: 171 MMHG | WEIGHT: 172 LBS | BODY MASS INDEX: 25.48 KG/M2

## 2024-02-02 PROCEDURE — 29580 STRAPPING UNNA BOOT: CPT | Mod: RT

## 2024-02-02 NOTE — ASSESSMENT
[FreeTextEntry1] : wound cleansed, unna, foam, compression reapplied increase to 3xweek unna.  pt does not want VNS, prefers to come to office  starting pentoxifylline for VLU

## 2024-02-02 NOTE — HISTORY OF PRESENT ILLNESS
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 He reports remote history of trauma to right heel which required local wound care by his podiatrist which eventually resolved, and similar history of right toe wound requiring local wound care by podiatry which eventually resolved. Patient now presents with right foot pain, erythema, and swelling. This first began back in June of this year at which point he noticed right foot pain while gardening, associated with weeping edema from R foot/ankle. Since then he has been intermittently seen by podiatry, completed a course of mupirocin/doxycycline, and has had intermittent local wound care. He was last seen by Podiatry two weeks ago at which point local wound care was provided and he was referred to Vascular surgery. The pain is localized to the R foot and ankle, with no aggravating or alleviating factors. He denies fevers, chills. He previously wore compression stockings but is now unable to due to the extent of the edema.  [de-identified] : 1/26/24: pt for fu.  he had his bandage changed at podiatry earlier in the week.  pt notices improvement  2/2/24.  increased  drainage since last week.  saturated through dressing

## 2024-02-02 NOTE — PHYSICAL EXAM
[de-identified] : well appearing.   Cardiovascular: normal rate and rhythm. Femoral: right 2+ and left 2+. Palpable femoral areteries bilaterally, R PT/DP not palpable due to significant edema, R erythema and edema of first and second toes, and heel, venous stasis dermatitis of right medial leg, LLE unremarkable no wounds no skin changes no edema.   ankle edema of the right ankle   venous stasis of the right leg   Musculoskeletal: sensation and motor grossly intact in all extremities.

## 2024-02-05 ENCOUNTER — APPOINTMENT (OUTPATIENT)
Dept: VASCULAR SURGERY | Facility: CLINIC | Age: 74
End: 2024-02-05
Payer: MEDICARE

## 2024-02-05 VITALS
HEIGHT: 69 IN | TEMPERATURE: 97.4 F | HEART RATE: 85 BPM | WEIGHT: 172 LBS | DIASTOLIC BLOOD PRESSURE: 79 MMHG | SYSTOLIC BLOOD PRESSURE: 169 MMHG | BODY MASS INDEX: 25.48 KG/M2

## 2024-02-05 PROCEDURE — 29580 STRAPPING UNNA BOOT: CPT | Mod: RT

## 2024-02-05 PROCEDURE — 99212 OFFICE O/P EST SF 10 MIN: CPT | Mod: 25

## 2024-02-05 RX ORDER — PENTOXIFYLLINE 400 MG/1
400 TABLET, EXTENDED RELEASE ORAL
Qty: 270 | Refills: 3 | Status: ACTIVE | COMMUNITY
Start: 2024-02-05 | End: 1900-01-01

## 2024-02-05 NOTE — PHYSICAL EXAM
[2+] : left 2+ [1+] : right 1+ [Ankle Swelling (On Exam)] : present [Ankle Swelling On The Right] : mild [] : bilaterally [Ankle Swelling On The Left] : moderate [Skin Ulcer] : ulcer [Alert] : alert [Oriented to Person] : oriented to person [Oriented to Place] : oriented to place [Oriented to Time] : oriented to time [Calm] : calm [de-identified] : well appearing.   Cardiovascular: normal rate and rhythm. Femoral: right 2+ and left 2+. Palpable femoral areteries bilaterally, R PT/DP not palpable due to significant edema, R erythema and edema of first and second toes, and heel, venous stasis dermatitis of right medial leg, LLE unremarkable no wounds no skin changes no edema.   ankle edema of the right ankle   venous stasis of the right leg   Musculoskeletal: sensation and motor grossly intact in all extremities.  [de-identified] : NCAT [de-identified] : unlabored breathing [FreeTextEntry1] : right medial/posterior calf wounds with drainage right erythema and edema to toes 2-4 with drainage [de-identified] : RLE medial/posterior calf [de-identified] : cooperative

## 2024-02-05 NOTE — ASSESSMENT
[FreeTextEntry1] : Impression - venous insufficiency with nonhealing RLE wounds   Plan Conservative medical management - leg elevation, exercise regimen, moisturize skin, calf muscle exercises, continue LLE knee high compression stockings, transition to RLE knee high 20-30 mm hg compression stockings once wounds are healed.  RLE unna boot in office 3x/week Advised pt to take pentoxifylline 400 mg TID as per Dr. Cuellar Return to office this Wednesday for RLE unna boot dressing change   [Arterial/Venous Disease] : arterial/venous disease [Medication Management] : medication management [Other: _____] : [unfilled] [Ulcer Care] : ulcer care

## 2024-02-05 NOTE — HISTORY OF PRESENT ILLNESS
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 He reports remote history of trauma to right heel which required local wound care by his podiatrist which eventually resolved, and similar history of right toe wound requiring local wound care by podiatry which eventually resolved. Patient now presents with right foot pain, erythema, and swelling. This first began back in June of this year at which point he noticed right foot pain while gardening, associated with weeping edema from R foot/ankle. Since then he has been intermittently seen by podiatry, completed a course of mupirocin/doxycycline, and has had intermittent local wound care. He was last seen by Podiatry two weeks ago at which point local wound care was provided and he was referred to Vascular surgery. The pain is localized to the R foot and ankle, with no aggravating or alleviating factors. He denies fevers, chills. He previously wore compression stockings but is now unable to due to the extent of the edema.  [de-identified] : 1/26/24: pt for fu.  he had his bandage changed at podiatry earlier in the week.  pt notices improvement  2/2/24.  increased  drainage since last week.  saturated through dressing  2/5/24: Pt is here for RLE unna boot dressing change. Draining still but not getting worse. Pt is not very consistent with leg elevation. States his leg and foot feel numb from elevation. No new wounds. Denies problems with LLE. Compliant with LLE compression stocking use. Hasn't started taking pentoxifylline yet.

## 2024-02-07 ENCOUNTER — APPOINTMENT (OUTPATIENT)
Dept: VASCULAR SURGERY | Facility: CLINIC | Age: 74
End: 2024-02-07
Payer: MEDICARE

## 2024-02-07 VITALS — DIASTOLIC BLOOD PRESSURE: 77 MMHG | HEART RATE: 72 BPM | SYSTOLIC BLOOD PRESSURE: 175 MMHG

## 2024-02-07 VITALS
TEMPERATURE: 98 F | DIASTOLIC BLOOD PRESSURE: 75 MMHG | HEIGHT: 69 IN | HEART RATE: 71 BPM | SYSTOLIC BLOOD PRESSURE: 171 MMHG | BODY MASS INDEX: 25.33 KG/M2 | WEIGHT: 171 LBS

## 2024-02-07 PROCEDURE — 29580 STRAPPING UNNA BOOT: CPT | Mod: RT

## 2024-02-07 PROCEDURE — 99212 OFFICE O/P EST SF 10 MIN: CPT | Mod: 25

## 2024-02-08 NOTE — PHYSICAL EXAM
[2+] : left 2+ [1+] : right 1+ [Ankle Swelling (On Exam)] : present [Ankle Swelling On The Right] : mild [] : bilaterally [Ankle Swelling On The Left] : moderate [Skin Ulcer] : ulcer [Alert] : alert [Oriented to Person] : oriented to person [Oriented to Place] : oriented to place [Oriented to Time] : oriented to time [Calm] : calm [de-identified] : well appearing.   Cardiovascular: normal rate and rhythm. Femoral: right 2+ and left 2+. Palpable femoral areteries bilaterally, R PT/DP not palpable due to significant edema, R erythema and edema of first and second toes, and heel, venous stasis dermatitis of right medial leg, LLE unremarkable no wounds no skin changes no edema.   ankle edema of the right ankle   venous stasis of the right leg   Musculoskeletal: sensation and motor grossly intact in all extremities.  [de-identified] : NCAT [de-identified] : unlabored breathing [FreeTextEntry1] : right medial/posterior calf wounds with drainage right erythema and edema to toes 2-4 with drainage drainage improving [de-identified] : RLE medial/posterior calf [de-identified] : cooperative

## 2024-02-08 NOTE — HISTORY OF PRESENT ILLNESS
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 He reports remote history of trauma to right heel which required local wound care by his podiatrist which eventually resolved, and similar history of right toe wound requiring local wound care by podiatry which eventually resolved. Patient now presents with right foot pain, erythema, and swelling. This first began back in June of this year at which point he noticed right foot pain while gardening, associated with weeping edema from R foot/ankle. Since then he has been intermittently seen by podiatry, completed a course of mupirocin/doxycycline, and has had intermittent local wound care. He was last seen by Podiatry two weeks ago at which point local wound care was provided and he was referred to Vascular surgery. The pain is localized to the R foot and ankle, with no aggravating or alleviating factors. He denies fevers, chills. He previously wore compression stockings but is now unable to due to the extent of the edema.  [de-identified] : 1/26/24: pt for fu.  he had his bandage changed at podiatry earlier in the week.  pt notices improvement  2/2/24.  increased  drainage since last week.  saturated through dressing  2/5/24: Pt is here for RLE unna boot dressing change. Draining still but not getting worse. Pt is not very consistent with leg elevation. States his leg and foot feel numb from elevation. No new wounds. Denies problems with LLE. Compliant with LLE compression stocking use. Hasn't started taking pentoxifylline yet.  2/7/24: Here for RLE unna boot dressing change. Drainage improved. Pt is on Warfarin for RLE dvt, which is being managed by his PCP. Has not started taking pentoxifylline yet. Pt states he would rather take natural supplements for circulation.

## 2024-02-08 NOTE — ASSESSMENT
[FreeTextEntry1] : Impression - venous insufficiency with nonhealing RLE wounds   Plan Conservative medical management - leg elevation, exercise regimen, moisturize skin, calf muscle exercises, continue LLE knee high compression stockings, transition to RLE knee high 20-30 mm hg compression stockings once wounds are healed.  RLE unna boot in office 3x/week Advised pt to take pentoxifylline 400 mg TID as per Dr. Cuellar Return to office this Friday for RLE unna boot dressing change   [Arterial/Venous Disease] : arterial/venous disease [Medication Management] : medication management [Other: _____] : [unfilled] [Ulcer Care] : ulcer care

## 2024-02-09 ENCOUNTER — APPOINTMENT (OUTPATIENT)
Dept: VASCULAR SURGERY | Facility: CLINIC | Age: 74
End: 2024-02-09
Payer: MEDICARE

## 2024-02-09 VITALS
BODY MASS INDEX: 25.33 KG/M2 | SYSTOLIC BLOOD PRESSURE: 158 MMHG | HEART RATE: 73 BPM | HEIGHT: 69 IN | DIASTOLIC BLOOD PRESSURE: 80 MMHG | TEMPERATURE: 98.3 F | WEIGHT: 171 LBS

## 2024-02-09 PROCEDURE — 29580 STRAPPING UNNA BOOT: CPT | Mod: RT

## 2024-02-09 NOTE — ASSESSMENT
[FreeTextEntry1] : wound cleansed unna, xtrasorb, compression cont 3xweek dressing  pt is on coumadin.  there is interaction w pentoxyfylline,  therefore not a candidate

## 2024-02-09 NOTE — PHYSICAL EXAM
[de-identified] : well appearing.   Cardiovascular: normal rate and rhythm. Femoral: right 2+ and left 2+. Palpable femoral areteries bilaterally, R PT/DP not palpable due to significant edema, R erythema and edema of first and second toes, and heel, venous stasis dermatitis of right medial leg, LLE unremarkable no wounds no skin changes no edema.   ankle edema of the right ankle   venous stasis of the right leg   Musculoskeletal: sensation and motor grossly intact in all extremities.

## 2024-02-09 NOTE — HISTORY OF PRESENT ILLNESS
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 He reports remote history of trauma to right heel which required local wound care by his podiatrist which eventually resolved, and similar history of right toe wound requiring local wound care by podiatry which eventually resolved. Patient now presents with right foot pain, erythema, and swelling. This first began back in June of this year at which point he noticed right foot pain while gardening, associated with weeping edema from R foot/ankle. Since then he has been intermittently seen by podiatry, completed a course of mupirocin/doxycycline, and has had intermittent local wound care. He was last seen by Podiatry two weeks ago at which point local wound care was provided and he was referred to Vascular surgery. The pain is localized to the R foot and ankle, with no aggravating or alleviating factors. He denies fevers, chills. He previously wore compression stockings but is now unable to due to the extent of the edema.  [de-identified] : 1/26/24: pt for fu.  he had his bandage changed at podiatry earlier in the week.  pt notices improvement  2/2/24.  increased  drainage since last week.  saturated through dressing  2/9/24: improved appearance, improved odor

## 2024-02-12 ENCOUNTER — APPOINTMENT (OUTPATIENT)
Dept: VASCULAR SURGERY | Facility: CLINIC | Age: 74
End: 2024-02-12
Payer: MEDICARE

## 2024-02-12 VITALS
TEMPERATURE: 98 F | HEART RATE: 76 BPM | BODY MASS INDEX: 25.48 KG/M2 | HEIGHT: 69 IN | SYSTOLIC BLOOD PRESSURE: 150 MMHG | DIASTOLIC BLOOD PRESSURE: 89 MMHG | WEIGHT: 172 LBS

## 2024-02-12 PROCEDURE — 29580 STRAPPING UNNA BOOT: CPT | Mod: RT

## 2024-02-12 PROCEDURE — 99213 OFFICE O/P EST LOW 20 MIN: CPT | Mod: 25

## 2024-02-12 RX ORDER — UBIDECARENONE/VIT E ACET 100MG-5
CAPSULE ORAL
Refills: 0 | Status: ACTIVE | COMMUNITY

## 2024-02-12 NOTE — PHYSICAL EXAM
[de-identified] : well appearing.   Cardiovascular: normal rate and rhythm. Femoral: right 2+ and left 2+. Palpable femoral areteries bilaterally, R PT/DP not palpable due to significant edema, R erythema and edema of first and second toes, and heel, venous stasis dermatitis of right medial leg, LLE unremarkable no wounds no skin changes no edema.   ankle edema of the right ankle   venous stasis of the right leg   Musculoskeletal: sensation and motor grossly intact in all extremities.  [FreeTextEntry1] : Right posterior calf small superficial ulcer. Blister of anterior right shin. Edema of toes.  fair minus

## 2024-02-12 NOTE — HISTORY OF PRESENT ILLNESS
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 He reports remote history of trauma to right heel which required local wound care by his podiatrist which eventually resolved, and similar history of right toe wound requiring local wound care by podiatry which eventually resolved. Patient now presents with right foot pain, erythema, and swelling. This first began back in June of this year at which point he noticed right foot pain while gardening, associated with weeping edema from R foot/ankle. Since then he has been intermittently seen by podiatry, completed a course of mupirocin/doxycycline, and has had intermittent local wound care. He was last seen by Podiatry two weeks ago at which point local wound care was provided and he was referred to Vascular surgery. The pain is localized to the R foot and ankle, with no aggravating or alleviating factors. He denies fevers, chills. He previously wore compression stockings but is now unable to due to the extent of the edema.  [de-identified] : 1/26/24: pt for fu.  he had his bandage changed at podiatry earlier in the week.  pt notices improvement  2/2/24.  increased  drainage since last week.  saturated through dressing  2/9/24: improved appearance, improved odor  2/12/24: patient presents for RLE Unna boot dressing change. Reports decreased ulcer size of posterior calf and anterior shin. Reports skin over toes is more edematous and with drainage. He reports that dressing soaked through yesterday.

## 2024-02-14 ENCOUNTER — APPOINTMENT (OUTPATIENT)
Dept: VASCULAR SURGERY | Facility: CLINIC | Age: 74
End: 2024-02-14

## 2024-02-16 ENCOUNTER — APPOINTMENT (OUTPATIENT)
Dept: VASCULAR SURGERY | Facility: CLINIC | Age: 74
End: 2024-02-16
Payer: MEDICARE

## 2024-02-16 VITALS
WEIGHT: 172 LBS | BODY MASS INDEX: 25.48 KG/M2 | HEART RATE: 69 BPM | DIASTOLIC BLOOD PRESSURE: 75 MMHG | SYSTOLIC BLOOD PRESSURE: 169 MMHG | TEMPERATURE: 98.1 F | HEIGHT: 69 IN

## 2024-02-16 PROCEDURE — 29580 STRAPPING UNNA BOOT: CPT | Mod: RT

## 2024-02-16 NOTE — PHYSICAL EXAM
[2+] : left 2+ [Ankle Swelling (On Exam)] : present [Ankle Swelling On The Right] : of the right ankle [] : of the right leg [Ankle Swelling On The Left] : moderate [Varicose Veins Of Lower Extremities] : not present

## 2024-02-16 NOTE — END OF VISIT
[] : Resident [FreeTextEntry3] : wound cleansed.  decreased drainage twice weekly unna, foam, compression

## 2024-02-16 NOTE — ASSESSMENT
[FreeTextEntry1] : right leg VI, weeping edema wound cleansed unna boot, xtrasorb, compression return next week twice weekly unna boot changes

## 2024-02-16 NOTE — HISTORY OF PRESENT ILLNESS
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 He reports remote history of trauma to right heel which required local wound care by his podiatrist which eventually resolved, and similar history of right toe wound requiring local wound care by podiatry which eventually resolved. Patient now presents with right foot pain, erythema, and swelling. This first began back in June of this year at which point he noticed right foot pain while gardening, associated with weeping edema from R foot/ankle. Since then he has been intermittently seen by podiatry, completed a course of mupirocin/doxycycline, and has had intermittent local wound care. He was last seen by Podiatry two weeks ago at which point local wound care was provided and he was referred to Vascular surgery. The pain is localized to the R foot and ankle, with no aggravating or alleviating factors. He denies fevers, chills. He previously wore compression stockings but is now unable to due to the extent of the edema.   Interval History: 1/26/24: pt for fu. he had his bandage changed at podiatry earlier in the week. pt notices improvement  2/2/24. increased drainage since last week. saturated through dressing  2/9/24: improved appearance, improved odor  2/12/24: patient presents for RLE Unna boot dressing change. Reports decreased ulcer size of posterior calf and anterior shin. Reports skin over toes is more edematous and with drainage. He reports that dressing soaked through yesterday.  2/16/24: reports that drainage much improved

## 2024-02-20 ENCOUNTER — APPOINTMENT (OUTPATIENT)
Dept: VASCULAR SURGERY | Facility: CLINIC | Age: 74
End: 2024-02-20
Payer: MEDICARE

## 2024-02-20 VITALS
WEIGHT: 172 LBS | HEIGHT: 69 IN | TEMPERATURE: 98.2 F | DIASTOLIC BLOOD PRESSURE: 80 MMHG | HEART RATE: 73 BPM | SYSTOLIC BLOOD PRESSURE: 154 MMHG | BODY MASS INDEX: 25.48 KG/M2

## 2024-02-20 VITALS — HEART RATE: 72 BPM | DIASTOLIC BLOOD PRESSURE: 72 MMHG | SYSTOLIC BLOOD PRESSURE: 138 MMHG

## 2024-02-20 PROCEDURE — 29580 STRAPPING UNNA BOOT: CPT | Mod: RT

## 2024-02-20 PROCEDURE — 99212 OFFICE O/P EST SF 10 MIN: CPT | Mod: 25

## 2024-02-20 NOTE — ASSESSMENT
[FreeTextEntry1] : Impression - venous insufficiency with slow to heal RLE wounds   Plan Conservative medical management - leg elevation, exercise regimen, moisturize skin, calf muscle exercises, continue LLE knee high compression stockings, transition to RLE knee high 20-30 mm hg compression stockings once wounds are healed.  RLE unna boot in office 2x/week Return to office this Friday for RLE unna boot dressing change   [Arterial/Venous Disease] : arterial/venous disease [Medication Management] : medication management [Other: _____] : [unfilled] [Ulcer Care] : ulcer care

## 2024-02-20 NOTE — PHYSICAL EXAM
[1+] : right 1+ [2+] : left 2+ [Ankle Swelling (On Exam)] : present [Ankle Swelling On The Right] : of the right ankle [Varicose Veins Of Lower Extremities] : not present [] : bilaterally [Ankle Swelling On The Left] : moderate [Skin Ulcer] : ulcer [Alert] : alert [Oriented to Person] : oriented to person [Oriented to Place] : oriented to place [Oriented to Time] : oriented to time [Calm] : calm [de-identified] : well appearing.   Cardiovascular: normal rate and rhythm. Femoral: right 2+ and left 2+. Palpable femoral areteries bilaterally, R PT/DP not palpable due to significant edema, R erythema and edema of first and second toes, and heel, venous stasis dermatitis of right medial leg, LLE unremarkable no wounds no skin changes no edema.   ankle edema of the right ankle   venous stasis of the right leg   Musculoskeletal: sensation and motor grossly intact in all extremities.  [de-identified] : NCAT [de-identified] : unlabored breathing [FreeTextEntry1] : right medial/posterior calf wounds with drainage right erythema and edema to toes 2-4 with drainage drainage improving [de-identified] : RLE medial/posterior calf [de-identified] : cooperative

## 2024-02-20 NOTE — HISTORY OF PRESENT ILLNESS
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 He reports remote history of trauma to right heel which required local wound care by his podiatrist which eventually resolved, and similar history of right toe wound requiring local wound care by podiatry which eventually resolved. Patient now presents with right foot pain, erythema, and swelling. This first began back in June of this year at which point he noticed right foot pain while gardening, associated with weeping edema from R foot/ankle. Since then he has been intermittently seen by podiatry, completed a course of mupirocin/doxycycline, and has had intermittent local wound care. He was last seen by Podiatry two weeks ago at which point local wound care was provided and he was referred to Vascular surgery. The pain is localized to the R foot and ankle, with no aggravating or alleviating factors. He denies fevers, chills. He previously wore compression stockings but is now unable to due to the extent of the edema.   Interval History: 1/26/24: pt for fu. he had his bandage changed at podiatry earlier in the week. pt notices improvement  2/2/24. increased drainage since last week. saturated through dressing  2/9/24: improved appearance, improved odor  2/12/24: patient presents for RLE Unna boot dressing change. Reports decreased ulcer size of posterior calf and anterior shin. Reports skin over toes is more edematous and with drainage. He reports that dressing soaked through yesterday.  2/16/24: reports that drainage much improved  2/20/24: here for RLE unna boot dressing change. Drainage is improving. No new wounds

## 2024-02-23 ENCOUNTER — APPOINTMENT (OUTPATIENT)
Dept: VASCULAR SURGERY | Facility: CLINIC | Age: 74
End: 2024-02-23
Payer: MEDICARE

## 2024-02-23 VITALS
HEART RATE: 65 BPM | BODY MASS INDEX: 25.48 KG/M2 | TEMPERATURE: 98.2 F | WEIGHT: 172 LBS | DIASTOLIC BLOOD PRESSURE: 79 MMHG | SYSTOLIC BLOOD PRESSURE: 159 MMHG | HEIGHT: 69 IN

## 2024-02-23 PROCEDURE — 29580 STRAPPING UNNA BOOT: CPT | Mod: RT

## 2024-02-23 NOTE — PHYSICAL EXAM
[de-identified] : well appearing.   Cardiovascular: normal rate and rhythm. Femoral: right 2+ and left 2+. Palpable femoral areteries bilaterally, R PT/DP not palpable due to significant edema, R erythema and edema of first and second toes, and heel, venous stasis dermatitis of right medial leg, LLE unremarkable no wounds no skin changes no edema.   ankle edema of the right ankle   venous stasis of the right leg   Musculoskeletal: sensation and motor grossly intact in all extremities.

## 2024-02-23 NOTE — ASSESSMENT
[FreeTextEntry1] : RLE cleansed.  unna, xtrasorb, compression reapplied. pt returning mon/fri. If continues to have increased drainage may need to increase dressing frequency to 3x/week

## 2024-02-23 NOTE — HISTORY OF PRESENT ILLNESS
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 He reports remote history of trauma to right heel which required local wound care by his podiatrist which eventually resolved, and similar history of right toe wound requiring local wound care by podiatry which eventually resolved. Patient now presents with right foot pain, erythema, and swelling. This first began back in June of this year at which point he noticed right foot pain while gardening, associated with weeping edema from R foot/ankle. Since then he has been intermittently seen by podiatry, completed a course of mupirocin/doxycycline, and has had intermittent local wound care. He was last seen by Podiatry two weeks ago at which point local wound care was provided and he was referred to Vascular surgery. The pain is localized to the R foot and ankle, with no aggravating or alleviating factors. He denies fevers, chills. He previously wore compression stockings but is now unable to due to the extent of the edema.  [de-identified] : 1/26/24: pt for fu.  he had his bandage changed at podiatry earlier in the week.  pt notices improvement  2/2/24.  increased  drainage since last week.  saturated through dressing  2/9/24: improved appearance, improved odor  2/12/24: patient presents for RLE Unna boot dressing change. Reports decreased ulcer size of posterior calf and anterior shin. Reports skin over toes is more edematous and with drainage. He reports that dressing soaked through yesterday.  2/16/24: reports that drainage much improved  2/23/24: heel is bothering him, somewhat increased drainage

## 2024-02-26 ENCOUNTER — APPOINTMENT (OUTPATIENT)
Dept: VASCULAR SURGERY | Facility: CLINIC | Age: 74
End: 2024-02-26
Payer: MEDICARE

## 2024-02-26 PROCEDURE — 29580 STRAPPING UNNA BOOT: CPT | Mod: RT

## 2024-02-26 PROCEDURE — 99212 OFFICE O/P EST SF 10 MIN: CPT | Mod: 25

## 2024-02-27 NOTE — PHYSICAL EXAM
[1+] : right 1+ [2+] : left 2+ [Ankle Swelling (On Exam)] : present [Ankle Swelling On The Right] : of the right ankle [Varicose Veins Of Lower Extremities] : not present [] : bilaterally [Ankle Swelling On The Left] : moderate [Skin Ulcer] : ulcer [Alert] : alert [Oriented to Person] : oriented to person [Oriented to Place] : oriented to place [Oriented to Time] : oriented to time [Calm] : calm [de-identified] : well appearing.   Cardiovascular: normal rate and rhythm. Femoral: right 2+ and left 2+. Palpable femoral areteries bilaterally, R PT/DP not palpable due to significant edema, R erythema and edema of first and second toes, and heel, venous stasis dermatitis of right medial leg, LLE unremarkable no wounds no skin changes no edema.   ankle edema of the right ankle   venous stasis of the right leg   Musculoskeletal: sensation and motor grossly intact in all extremities.  [de-identified] : NCAT [de-identified] : unlabored breathing [FreeTextEntry1] : right medial/posterior calf wounds with drainage right erythema and edema to toes 2-4 with drainage drainage improving [de-identified] : RLE medial/posterior calf [de-identified] : cooperative

## 2024-02-27 NOTE — HISTORY OF PRESENT ILLNESS
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 He reports remote history of trauma to right heel which required local wound care by his podiatrist which eventually resolved, and similar history of right toe wound requiring local wound care by podiatry which eventually resolved. Patient now presents with right foot pain, erythema, and swelling. This first began back in June of this year at which point he noticed right foot pain while gardening, associated with weeping edema from R foot/ankle. Since then he has been intermittently seen by podiatry, completed a course of mupirocin/doxycycline, and has had intermittent local wound care. He was last seen by Podiatry two weeks ago at which point local wound care was provided and he was referred to Vascular surgery. The pain is localized to the R foot and ankle, with no aggravating or alleviating factors. He denies fevers, chills. He previously wore compression stockings but is now unable to due to the extent of the edema.   Interval History: 1/26/24: pt for fu. he had his bandage changed at podiatry earlier in the week. pt notices improvement  2/2/24. increased drainage since last week. saturated through dressing  2/9/24: improved appearance, improved odor  2/12/24: patient presents for RLE Unna boot dressing change. Reports decreased ulcer size of posterior calf and anterior shin. Reports skin over toes is more edematous and with drainage. He reports that dressing soaked through yesterday.  2/16/24: reports that drainage much improved  2/20/24: here for RLE unna boot dressing change. Drainage is improving. No new wounds  2/26/24: presents for RLE unna boot dressing change. No new complaints. Wounds and drainage are improving.

## 2024-03-01 ENCOUNTER — APPOINTMENT (OUTPATIENT)
Dept: VASCULAR SURGERY | Facility: CLINIC | Age: 74
End: 2024-03-01
Payer: MEDICARE

## 2024-03-01 VITALS
HEIGHT: 69 IN | TEMPERATURE: 98.1 F | BODY MASS INDEX: 25.48 KG/M2 | WEIGHT: 172 LBS | SYSTOLIC BLOOD PRESSURE: 159 MMHG | DIASTOLIC BLOOD PRESSURE: 83 MMHG | HEART RATE: 84 BPM

## 2024-03-01 PROCEDURE — 29580 STRAPPING UNNA BOOT: CPT | Mod: RT

## 2024-03-04 ENCOUNTER — APPOINTMENT (OUTPATIENT)
Dept: VASCULAR SURGERY | Facility: CLINIC | Age: 74
End: 2024-03-04
Payer: MEDICARE

## 2024-03-04 VITALS
HEIGHT: 69 IN | TEMPERATURE: 98 F | SYSTOLIC BLOOD PRESSURE: 142 MMHG | WEIGHT: 172 LBS | BODY MASS INDEX: 25.48 KG/M2 | HEART RATE: 76 BPM | DIASTOLIC BLOOD PRESSURE: 77 MMHG

## 2024-03-04 PROCEDURE — 29580 STRAPPING UNNA BOOT: CPT | Mod: RT

## 2024-03-04 PROCEDURE — 99213 OFFICE O/P EST LOW 20 MIN: CPT | Mod: 25

## 2024-03-04 NOTE — ASSESSMENT
[FreeTextEntry1] : 73-year-old male with [id number] venous insufficiency of RLE. Has been undergoing 3x per week unna boot changes for draining ulceration of RLE ankle.  Plan: - Continue multilayer compression therapy (Unna boot applied) - MWF appts for the next two weeks

## 2024-03-04 NOTE — PHYSICAL EXAM
[Ankle Swelling (On Exam)] : present [Ankle Swelling On The Right] : of the right ankle [de-identified] : NAD [de-identified] : Nonlabored breathing on room air [de-identified] : Regular rate [FreeTextEntry1] : Drainage noted from open wounds on RLE, erythema and swelling present

## 2024-03-04 NOTE — HISTORY OF PRESENT ILLNESS
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 He reports remote history of trauma to right heel which required local wound care by his podiatrist which eventually resolved, and similar history of right toe wound requiring local wound care by podiatry which eventually resolved. Patient now presents with right foot pain, erythema, and swelling. This first began back in June of this year at which point he noticed right foot pain while gardening, associated with weeping edema from R foot/ankle. Since then he has been intermittently seen by podiatry, completed a course of mupirocin/doxycycline, and has had intermittent local wound care. He was last seen by Podiatry two weeks ago at which point local wound care was provided and he was referred to Vascular surgery. The pain is localized to the R foot and ankle, with no aggravating or alleviating factors. He denies fevers, chills. He previously wore compression stockings but is now unable to due to the extent of the edema.  Interval History: 1/26/24: pt for fu. he had his bandage changed at podiatry earlier in the week. pt notices improvement  2/2/24. increased drainage since last week. saturated through dressing  2/9/24: improved appearance, improved odor  2/12/24: patient presents for RLE Unna boot dressing change. Reports decreased ulcer size of posterior calf and anterior shin. Reports skin over toes is more edematous and with drainage. He reports that dressing soaked through yesterday.  2/16/24: reports that drainage much improved  2/20/24: here for RLE unna boot dressing change. Drainage is improving. No new wounds  2/26/24: presents for RLE unna boot dressing change. No new complaints. Wounds and drainage are improving.  3/1/24: presents for RLE unna boot dressing change. states that drainage saturated last dressing and was leaking. wounds appear stable. drainage present.  3/4/24: presents for RLE Unna boot. Reports less erythema of foot. Drainage persists.

## 2024-03-06 ENCOUNTER — APPOINTMENT (OUTPATIENT)
Dept: VASCULAR SURGERY | Facility: CLINIC | Age: 74
End: 2024-03-06
Payer: MEDICARE

## 2024-03-06 VITALS
HEART RATE: 76 BPM | HEIGHT: 69 IN | TEMPERATURE: 97.9 F | SYSTOLIC BLOOD PRESSURE: 150 MMHG | WEIGHT: 172 LBS | BODY MASS INDEX: 25.48 KG/M2 | DIASTOLIC BLOOD PRESSURE: 79 MMHG

## 2024-03-06 VITALS — SYSTOLIC BLOOD PRESSURE: 156 MMHG | DIASTOLIC BLOOD PRESSURE: 80 MMHG | HEART RATE: 74 BPM

## 2024-03-06 PROCEDURE — 99212 OFFICE O/P EST SF 10 MIN: CPT | Mod: 25

## 2024-03-06 PROCEDURE — 29580 STRAPPING UNNA BOOT: CPT | Mod: RT

## 2024-03-06 NOTE — PHYSICAL EXAM
[2+] : right 2+ [1+] : right 1+ [Ankle Swelling (On Exam)] : present [Ankle Swelling On The Right] : of the right ankle [] : bilaterally [Ankle Swelling On The Left] : moderate [Skin Ulcer] : ulcer [Alert] : alert [Oriented to Person] : oriented to person [Oriented to Place] : oriented to place [Oriented to Time] : oriented to time [Calm] : calm [Varicose Veins Of Lower Extremities] : not present [de-identified] : well appearing.   Cardiovascular: normal rate and rhythm. Femoral: right 2+ and left 2+. Palpable femoral areteries bilaterally, R PT/DP not palpable due to significant edema, R erythema and edema of first and second toes, and heel, venous stasis dermatitis of right medial leg, LLE unremarkable no wounds no skin changes no edema.   ankle edema of the right ankle   venous stasis of the right leg   Musculoskeletal: sensation and motor grossly intact in all extremities.  [de-identified] : NCAT [de-identified] : unlabored breathing [FreeTextEntry1] : right medial/posterior calf wounds with less drainage right erythema and edema to toes 2-4 with less drainage drainage improving no new wounds [de-identified] : cooperative [de-identified] : RLE medial/posterior calf

## 2024-03-06 NOTE — HISTORY OF PRESENT ILLNESS
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 He reports remote history of trauma to right heel which required local wound care by his podiatrist which eventually resolved, and similar history of right toe wound requiring local wound care by podiatry which eventually resolved. Patient now presents with right foot pain, erythema, and swelling. This first began back in June of this year at which point he noticed right foot pain while gardening, associated with weeping edema from R foot/ankle. Since then he has been intermittently seen by podiatry, completed a course of mupirocin/doxycycline, and has had intermittent local wound care. He was last seen by Podiatry two weeks ago at which point local wound care was provided and he was referred to Vascular surgery. The pain is localized to the R foot and ankle, with no aggravating or alleviating factors. He denies fevers, chills. He previously wore compression stockings but is now unable to due to the extent of the edema.   Interval History: 1/26/24: pt for fu. he had his bandage changed at podiatry earlier in the week. pt notices improvement  2/2/24. increased drainage since last week. saturated through dressing  2/9/24: improved appearance, improved odor  2/12/24: patient presents for RLE Unna boot dressing change. Reports decreased ulcer size of posterior calf and anterior shin. Reports skin over toes is more edematous and with drainage. He reports that dressing soaked through yesterday.  2/16/24: reports that drainage much improved  2/20/24: here for RLE unna boot dressing change. Drainage is improving. No new wounds  2/26/24: presents for RLE unna boot dressing change. No new complaints. Wounds and drainage are improving.  3/6/2024: RLE wounds are healing nicely. Reports xtrasorb pad was heavily saturated couple of days ago. Minimal drainage during today's visit.  No new wounds.

## 2024-03-06 NOTE — ASSESSMENT
[Arterial/Venous Disease] : arterial/venous disease [Other: _____] : [unfilled] [FreeTextEntry1] : Impression - venous insufficiency with slow to heal RLE wounds   Plan Conservative medical management - leg elevation, exercise regimen, moisturize skin, calf muscle exercises, continue LLE knee high compression stockings, transition to RLE knee high 20-30 mm hg compression stockings once wounds are healed.  RLE unna boot in office 3x/week Return to office this Friday for RLE unna boot dressing change

## 2024-03-08 ENCOUNTER — APPOINTMENT (OUTPATIENT)
Dept: VASCULAR SURGERY | Facility: CLINIC | Age: 74
End: 2024-03-08
Payer: MEDICARE

## 2024-03-08 VITALS
WEIGHT: 172 LBS | BODY MASS INDEX: 25.48 KG/M2 | SYSTOLIC BLOOD PRESSURE: 133 MMHG | TEMPERATURE: 97.9 F | HEIGHT: 69 IN | DIASTOLIC BLOOD PRESSURE: 77 MMHG | HEART RATE: 69 BPM

## 2024-03-08 PROCEDURE — 29580 STRAPPING UNNA BOOT: CPT | Mod: RT

## 2024-03-08 NOTE — PHYSICAL EXAM
[Ankle Swelling (On Exam)] : present [Ankle Swelling On The Right] : of the right ankle [] : of the right leg [Ankle Swelling On The Left] : moderate

## 2024-03-11 ENCOUNTER — APPOINTMENT (OUTPATIENT)
Dept: VASCULAR SURGERY | Facility: CLINIC | Age: 74
End: 2024-03-11
Payer: MEDICARE

## 2024-03-11 VITALS
BODY MASS INDEX: 25.48 KG/M2 | TEMPERATURE: 98.1 F | DIASTOLIC BLOOD PRESSURE: 93 MMHG | WEIGHT: 172 LBS | HEART RATE: 81 BPM | SYSTOLIC BLOOD PRESSURE: 153 MMHG | HEIGHT: 69 IN

## 2024-03-11 PROCEDURE — 29580 STRAPPING UNNA BOOT: CPT | Mod: RT

## 2024-03-11 PROCEDURE — 99212 OFFICE O/P EST SF 10 MIN: CPT | Mod: 25

## 2024-03-11 NOTE — PHYSICAL EXAM
[2+] : right 2+ [1+] : right 1+ [Ankle Swelling (On Exam)] : present [Ankle Swelling On The Right] : of the right ankle [Varicose Veins Of Lower Extremities] : not present [] : present [Ankle Swelling On The Left] : moderate [Skin Ulcer] : ulcer [Oriented to Person] : oriented to person [Alert] : alert [Oriented to Place] : oriented to place [Oriented to Time] : oriented to time [Calm] : calm [de-identified] : well appearing.   Cardiovascular: normal rate and rhythm. Femoral: right 2+ and left 2+. Palpable femoral areteries bilaterally, R PT/DP not palpable due to significant edema, R erythema and edema of first and second toes, and heel, venous stasis dermatitis of right medial leg, LLE unremarkable no wounds no skin changes no edema.   ankle edema of the right ankle   venous stasis of the right leg   Musculoskeletal: sensation and motor grossly intact in all extremities.  [de-identified] : NCAT [de-identified] : unlabored breathing [FreeTextEntry1] : right medial/posterior calf wounds with less drainage right erythema and edema to toes 2-4 with less drainage drainage improving no new wounds [de-identified] : RLE medial/posterior calf [de-identified] : HERNESTOL [de-identified] : cooperative

## 2024-03-11 NOTE — ASSESSMENT
[FreeTextEntry1] : Impression - venous insufficiency with slow to heal RLE wounds   Plan Conservative medical management - leg elevation, exercise regimen, moisturize skin, calf muscle exercises, continue LLE knee high compression stockings, transition to RLE knee high 20-30 mm hg compression stockings once wounds are healed.  RLE unna boot in office 3x/week Return to office this Wednesday for RLE unna boot dressing change   [Other: _____] : [unfilled] [Arterial/Venous Disease] : arterial/venous disease

## 2024-03-11 NOTE — ASSESSMENT
[Arterial/Venous Disease] : arterial/venous disease [FreeTextEntry1] : Plan Conservative medical management - leg elevation, exercise regimen, moisturize skin, calf muscle exercises, continue LLE knee high compression stockings, transition to RLE knee high 20-30 mm hg compression stockings once wounds are healed. RLE unna boot in office 3x/week Return to office next week for RLE unna boot dressing change.

## 2024-03-11 NOTE — HISTORY OF PRESENT ILLNESS
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 He reports remote history of trauma to right heel which required local wound care by his podiatrist which eventually resolved, and similar history of right toe wound requiring local wound care by podiatry which eventually resolved. Patient now presents with right foot pain, erythema, and swelling. This first began back in June of this year at which point he noticed right foot pain while gardening, associated with weeping edema from R foot/ankle. Since then he has been intermittently seen by podiatry, completed a course of mupirocin/doxycycline, and has had intermittent local wound care. He was last seen by Podiatry two weeks ago at which point local wound care was provided and he was referred to Vascular surgery. The pain is localized to the R foot and ankle, with no aggravating or alleviating factors. He denies fevers, chills. He previously wore compression stockings but is now unable to due to the extent of the edema.   Interval History: 1/26/24: pt for fu. he had his bandage changed at podiatry earlier in the week. pt notices improvement  2/2/24. increased drainage since last week. saturated through dressing  2/9/24: improved appearance, improved odor  2/12/24: patient presents for RLE Unna boot dressing change. Reports decreased ulcer size of posterior calf and anterior shin. Reports skin over toes is more edematous and with drainage. He reports that dressing soaked through yesterday.  2/16/24: reports that drainage much improved  2/20/24: here for RLE unna boot dressing change. Drainage is improving. No new wounds  2/26/24: presents for RLE unna boot dressing change. No new complaints. Wounds and drainage are improving.  3/6/2024: RLE wounds are healing nicely. Reports xtrasorb pad was heavily saturated couple of days ago. Minimal drainage during today's visit.  No new wounds.  3/11/2024: here for RLE unna boot change. Minimal drainage. Improved odor. Wounds are slow to heal.

## 2024-03-11 NOTE — HISTORY OF PRESENT ILLNESS
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 He reports remote history of trauma to right heel which required local wound care by his podiatrist which eventually resolved, and similar history of right toe wound requiring local wound care by podiatry which eventually resolved. Patient now presents with right foot pain, erythema, and swelling. This first began back in June of this year at which point he noticed right foot pain while gardening, associated with weeping edema from R foot/ankle. Since then he has been intermittently seen by podiatry, completed a course of mupirocin/doxycycline, and has had intermittent local wound care. He was last seen by Podiatry two weeks ago at which point local wound care was provided and he was referred to Vascular surgery. The pain is localized to the R foot and ankle, with no aggravating or alleviating factors. He denies fevers, chills. He previously wore compression stockings but is now unable to due to the extent of the edema.  Interval History: 1/26/24: pt for fu. he had his bandage changed at podiatry earlier in the week. pt notices improvement  2/2/24. increased drainage since last week. saturated through dressing  2/9/24: improved appearance, improved odor  2/12/24: patient presents for RLE Unna boot dressing change. Reports decreased ulcer size of posterior calf and anterior shin. Reports skin over toes is more edematous and with drainage. He reports that dressing soaked through yesterday.  2/16/24: reports that drainage much improved  2/20/24: here for RLE unna boot dressing change. Drainage is improving. No new wounds  2/26/24: presents for RLE unna boot dressing change. No new complaints. Wounds and drainage are improving.  3/6/2024: RLE wounds are healing nicely. Reports xtrasorb pad was heavily saturated couple of days ago. Minimal drainage during today's visit. No new wounds.  3/8/24: drainage persists, overall improved
No indicators present

## 2024-03-13 ENCOUNTER — APPOINTMENT (OUTPATIENT)
Dept: VASCULAR SURGERY | Facility: CLINIC | Age: 74
End: 2024-03-13
Payer: MEDICARE

## 2024-03-13 VITALS
BODY MASS INDEX: 25.48 KG/M2 | WEIGHT: 172 LBS | HEART RATE: 75 BPM | TEMPERATURE: 97.8 F | HEIGHT: 69 IN | SYSTOLIC BLOOD PRESSURE: 145 MMHG | DIASTOLIC BLOOD PRESSURE: 84 MMHG

## 2024-03-13 PROCEDURE — 99212 OFFICE O/P EST SF 10 MIN: CPT | Mod: 25

## 2024-03-13 PROCEDURE — 29580 STRAPPING UNNA BOOT: CPT | Mod: RT

## 2024-03-13 NOTE — ASSESSMENT
[FreeTextEntry1] : Impression - venous insufficiency with slow to heal RLE wounds with improved drainage and odor   Plan Conservative medical management - leg elevation, exercise regimen, moisturize skin, calf muscle exercises, continue LLE knee high compression stockings, transition to RLE knee high 20-30 mm hg compression stockings once wounds are healed.  RLE unna boot in office 3x/week Return to office this Friday for RLE unna boot dressing change with Dr. Cuellar   [Arterial/Venous Disease] : arterial/venous disease [Other: _____] : [unfilled]

## 2024-03-13 NOTE — HISTORY OF PRESENT ILLNESS
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 He reports remote history of trauma to right heel which required local wound care by his podiatrist which eventually resolved, and similar history of right toe wound requiring local wound care by podiatry which eventually resolved. Patient now presents with right foot pain, erythema, and swelling. This first began back in June of this year at which point he noticed right foot pain while gardening, associated with weeping edema from R foot/ankle. Since then he has been intermittently seen by podiatry, completed a course of mupirocin/doxycycline, and has had intermittent local wound care. He was last seen by Podiatry two weeks ago at which point local wound care was provided and he was referred to Vascular surgery. The pain is localized to the R foot and ankle, with no aggravating or alleviating factors. He denies fevers, chills. He previously wore compression stockings but is now unable to due to the extent of the edema.   Interval History: 1/26/24: pt for fu. he had his bandage changed at podiatry earlier in the week. pt notices improvement  2/2/24. increased drainage since last week. saturated through dressing  2/9/24: improved appearance, improved odor  2/12/24: patient presents for RLE Unna boot dressing change. Reports decreased ulcer size of posterior calf and anterior shin. Reports skin over toes is more edematous and with drainage. He reports that dressing soaked through yesterday.  2/16/24: reports that drainage much improved  2/20/24: here for RLE unna boot dressing change. Drainage is improving. No new wounds  2/26/24: presents for RLE unna boot dressing change. No new complaints. Wounds and drainage are improving.  3/6/2024: RLE wounds are healing nicely. Reports xtrasorb pad was heavily saturated couple of days ago. Minimal drainage during today's visit.  No new wounds.  3/11/2024: here for RLE unna boot change. Minimal drainage. Improved odor. Wounds are slow to heal.  3/13/2024: here for RLE unna boot dressing change. No new wounds. Improved drainage.

## 2024-03-13 NOTE — PHYSICAL EXAM
[2+] : right 2+ [1+] : right 1+ [Ankle Swelling (On Exam)] : present [Ankle Swelling On The Right] : of the right ankle [Varicose Veins Of Lower Extremities] : not present [] : bilaterally [Ankle Swelling On The Left] : moderate [Skin Ulcer] : ulcer [Alert] : alert [Oriented to Person] : oriented to person [Oriented to Place] : oriented to place [Oriented to Time] : oriented to time [Calm] : calm [de-identified] : well appearing.   Cardiovascular: normal rate and rhythm. Femoral: right 2+ and left 2+. Palpable femoral areteries bilaterally, R PT/DP not palpable due to significant edema, R erythema and edema of first and second toes, and heel, venous stasis dermatitis of right medial leg, LLE unremarkable no wounds no skin changes no edema.   ankle edema of the right ankle   venous stasis of the right leg   Musculoskeletal: sensation and motor grossly intact in all extremities.  [de-identified] : NCAT [FreeTextEntry1] : right medial/posterior calf wounds with less drainage right erythema and edema to toes 2-4 with less drainage drainage improving no new wounds [de-identified] : unlabored breathing [de-identified] : cooperative [de-identified] : HERNESTOL [de-identified] : RLE medial/posterior calf

## 2024-03-15 ENCOUNTER — APPOINTMENT (OUTPATIENT)
Dept: VASCULAR SURGERY | Facility: CLINIC | Age: 74
End: 2024-03-15
Payer: MEDICARE

## 2024-03-15 VITALS
DIASTOLIC BLOOD PRESSURE: 87 MMHG | HEIGHT: 69 IN | WEIGHT: 172 LBS | SYSTOLIC BLOOD PRESSURE: 161 MMHG | BODY MASS INDEX: 25.48 KG/M2 | TEMPERATURE: 97.8 F | HEART RATE: 81 BPM

## 2024-03-15 PROCEDURE — 29580 STRAPPING UNNA BOOT: CPT | Mod: RT

## 2024-03-15 NOTE — PHYSICAL EXAM
[de-identified] : well appearing.   Cardiovascular: normal rate and rhythm. Femoral: right 2+ and left 2+. Palpable femoral areteries bilaterally, R PT/DP not palpable due to significant edema, R erythema and edema of first and second toes, and heel, venous stasis dermatitis of right medial leg, LLE unremarkable no wounds no skin changes no edema.   ankle edema of the right ankle   venous stasis of the right leg   Musculoskeletal: sensation and motor grossly intact in all extremities.

## 2024-03-15 NOTE — HISTORY OF PRESENT ILLNESS
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 He reports remote history of trauma to right heel which required local wound care by his podiatrist which eventually resolved, and similar history of right toe wound requiring local wound care by podiatry which eventually resolved. Patient now presents with right foot pain, erythema, and swelling. This first began back in June of this year at which point he noticed right foot pain while gardening, associated with weeping edema from R foot/ankle. Since then he has been intermittently seen by podiatry, completed a course of mupirocin/doxycycline, and has had intermittent local wound care. He was last seen by Podiatry two weeks ago at which point local wound care was provided and he was referred to Vascular surgery. The pain is localized to the R foot and ankle, with no aggravating or alleviating factors. He denies fevers, chills. He previously wore compression stockings but is now unable to due to the extent of the edema.  [de-identified] : 1/26/24: pt for fu.  he had his bandage changed at podiatry earlier in the week.  pt notices improvement  2/2/24.  increased  drainage since last week.  saturated through dressing  2/9/24: improved appearance, improved odor  2/12/24: patient presents for RLE Unna boot dressing change. Reports decreased ulcer size of posterior calf and anterior shin. Reports skin over toes is more edematous and with drainage. He reports that dressing soaked through yesterday.  2/16/24: reports that drainage much improved  2/23/24: heel is bothering him, somewhat increased drainage  2/26/24: presents for RLE unna boot dressing change. No new complaints. Wounds and drainage are improving.  3/6/2024: RLE wounds are healing nicely. Reports xtrasorb pad was heavily saturated couple of days ago. Minimal drainage during today's visit. No new wounds.  3/8/24: drainage persists, overall improved  3/15/24: improved odor/drainage

## 2024-03-18 ENCOUNTER — APPOINTMENT (OUTPATIENT)
Dept: VASCULAR SURGERY | Facility: CLINIC | Age: 74
End: 2024-03-18
Payer: MEDICARE

## 2024-03-18 VITALS
BODY MASS INDEX: 25.48 KG/M2 | TEMPERATURE: 97.4 F | WEIGHT: 172 LBS | HEIGHT: 69 IN | DIASTOLIC BLOOD PRESSURE: 73 MMHG | HEART RATE: 83 BPM | SYSTOLIC BLOOD PRESSURE: 138 MMHG

## 2024-03-18 VITALS — HEART RATE: 81 BPM | DIASTOLIC BLOOD PRESSURE: 80 MMHG | SYSTOLIC BLOOD PRESSURE: 137 MMHG

## 2024-03-18 PROCEDURE — 29580 STRAPPING UNNA BOOT: CPT | Mod: RT

## 2024-03-18 PROCEDURE — 99212 OFFICE O/P EST SF 10 MIN: CPT | Mod: 25

## 2024-03-18 NOTE — PHYSICAL EXAM
[2+] : right 2+ [1+] : right 1+ [Ankle Swelling (On Exam)] : present [Ankle Swelling On The Right] : of the right ankle [Varicose Veins Of Lower Extremities] : not present [] : bilaterally [Skin Ulcer] : ulcer [Ankle Swelling On The Left] : moderate [Alert] : alert [Oriented to Person] : oriented to person [Oriented to Place] : oriented to place [Oriented to Time] : oriented to time [Calm] : calm [de-identified] : NCAT [de-identified] : well appearing.   Cardiovascular: normal rate and rhythm. Femoral: right 2+ and left 2+. Palpable femoral areteries bilaterally, R PT/DP not palpable due to significant edema, R erythema and edema of first and second toes, and heel, venous stasis dermatitis of right medial leg, LLE unremarkable no wounds no skin changes no edema.   ankle edema of the right ankle   venous stasis of the right leg   Musculoskeletal: sensation and motor grossly intact in all extremities.  [de-identified] : unlabored breathing [FreeTextEntry1] : right medial/posterior calf wounds with less drainage right erythema and edema to toes 2-4 with less drainage drainage and odor improving no new wounds [de-identified] : HERNESTOL [de-identified] : RLE medial/posterior calf [de-identified] : cooperative

## 2024-03-18 NOTE — ASSESSMENT
[FreeTextEntry1] : Impression - venous insufficiency with slow to heal RLE wounds with improved drainage and odor   Plan Conservative medical management - leg elevation, exercise regimen, moisturize skin, calf muscle exercises, continue LLE knee high compression stockings, transition to RLE knee high 20-30 mm hg compression stockings once wounds are healed.  RLE unna boot in office 3x/week Return to office this Wednesday for RLE unna boot dressing change.    [Arterial/Venous Disease] : arterial/venous disease [Other: _____] : [unfilled]

## 2024-03-18 NOTE — HISTORY OF PRESENT ILLNESS
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 He reports remote history of trauma to right heel which required local wound care by his podiatrist which eventually resolved, and similar history of right toe wound requiring local wound care by podiatry which eventually resolved. Patient now presents with right foot pain, erythema, and swelling. This first began back in June of this year at which point he noticed right foot pain while gardening, associated with weeping edema from R foot/ankle. Since then he has been intermittently seen by podiatry, completed a course of mupirocin/doxycycline, and has had intermittent local wound care. He was last seen by Podiatry two weeks ago at which point local wound care was provided and he was referred to Vascular surgery. The pain is localized to the R foot and ankle, with no aggravating or alleviating factors. He denies fevers, chills. He previously wore compression stockings but is now unable to due to the extent of the edema.   Interval History: 1/26/24: pt for fu. he had his bandage changed at podiatry earlier in the week. pt notices improvement  2/2/24. increased drainage since last week. saturated through dressing  2/9/24: improved appearance, improved odor  2/12/24: patient presents for RLE Unna boot dressing change. Reports decreased ulcer size of posterior calf and anterior shin. Reports skin over toes is more edematous and with drainage. He reports that dressing soaked through yesterday.  2/16/24: reports that drainage much improved  2/20/24: here for RLE unna boot dressing change. Drainage is improving. No new wounds  2/26/24: presents for RLE unna boot dressing change. No new complaints. Wounds and drainage are improving.  3/6/2024: RLE wounds are healing nicely. Reports xtrasorb pad was heavily saturated couple of days ago. Minimal drainage during today's visit.  No new wounds.  3/11/2024: here for RLE unna boot change. Minimal drainage. Improved odor. Wounds are slow to heal.  3/13/2024: here for RLE unna boot dressing change. No new wounds. Improved drainage.   3/18/2024: here for RLE unna boot dressing change. Improved odor and drainage

## 2024-03-20 ENCOUNTER — APPOINTMENT (OUTPATIENT)
Dept: VASCULAR SURGERY | Facility: CLINIC | Age: 74
End: 2024-03-20
Payer: MEDICARE

## 2024-03-20 VITALS — SYSTOLIC BLOOD PRESSURE: 147 MMHG | DIASTOLIC BLOOD PRESSURE: 82 MMHG | HEART RATE: 64 BPM

## 2024-03-20 VITALS
HEART RATE: 67 BPM | TEMPERATURE: 97.4 F | WEIGHT: 172 LBS | SYSTOLIC BLOOD PRESSURE: 149 MMHG | BODY MASS INDEX: 25.48 KG/M2 | DIASTOLIC BLOOD PRESSURE: 79 MMHG | HEIGHT: 69 IN

## 2024-03-20 PROCEDURE — 29580 STRAPPING UNNA BOOT: CPT | Mod: RT

## 2024-03-20 PROCEDURE — 99212 OFFICE O/P EST SF 10 MIN: CPT | Mod: 25

## 2024-03-20 NOTE — HISTORY OF PRESENT ILLNESS
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 He reports remote history of trauma to right heel which required local wound care by his podiatrist which eventually resolved, and similar history of right toe wound requiring local wound care by podiatry which eventually resolved. Patient now presents with right foot pain, erythema, and swelling. This first began back in June of this year at which point he noticed right foot pain while gardening, associated with weeping edema from R foot/ankle. Since then he has been intermittently seen by podiatry, completed a course of mupirocin/doxycycline, and has had intermittent local wound care. He was last seen by Podiatry two weeks ago at which point local wound care was provided and he was referred to Vascular surgery. The pain is localized to the R foot and ankle, with no aggravating or alleviating factors. He denies fevers, chills. He previously wore compression stockings but is now unable to due to the extent of the edema.   Interval History: 1/26/24: pt for fu. he had his bandage changed at podiatry earlier in the week. pt notices improvement  2/2/24. increased drainage since last week. saturated through dressing  2/9/24: improved appearance, improved odor  2/12/24: patient presents for RLE Unna boot dressing change. Reports decreased ulcer size of posterior calf and anterior shin. Reports skin over toes is more edematous and with drainage. He reports that dressing soaked through yesterday.  2/16/24: reports that drainage much improved  2/20/24: here for RLE unna boot dressing change. Drainage is improving. No new wounds  2/26/24: presents for RLE unna boot dressing change. No new complaints. Wounds and drainage are improving.  3/6/2024: RLE wounds are healing nicely. Reports xtrasorb pad was heavily saturated couple of days ago. Minimal drainage during today's visit.  No new wounds.  3/11/2024: here for RLE unna boot change. Minimal drainage. Improved odor. Wounds are slow to heal.  3/13/2024: here for RLE unna boot dressing change. No new wounds. Improved drainage.   3/18/2024: here for RLE unna boot dressing change. Improved odor and drainage  3/20/2024: here for RLE unna boot dressing change. No new complaints. Drainage is getting better.

## 2024-03-20 NOTE — PHYSICAL EXAM
[1+] : right 1+ [2+] : right 2+ [Ankle Swelling (On Exam)] : present [Ankle Swelling On The Right] : of the right ankle [] : bilaterally [Ankle Swelling On The Left] : moderate [Skin Ulcer] : ulcer [Alert] : alert [Oriented to Person] : oriented to person [Oriented to Time] : oriented to time [Oriented to Place] : oriented to place [Calm] : calm [Varicose Veins Of Lower Extremities] : not present [de-identified] : well appearing.   Cardiovascular: normal rate and rhythm. Femoral: right 2+ and left 2+. Palpable femoral areteries bilaterally, R PT/DP not palpable due to significant edema, R erythema and edema of first and second toes, and heel, venous stasis dermatitis of right medial leg, LLE unremarkable no wounds no skin changes no edema.   ankle edema of the right ankle   venous stasis of the right leg   Musculoskeletal: sensation and motor grossly intact in all extremities.  [de-identified] : NCAT [de-identified] : unlabored breathing [FreeTextEntry1] : right medial/posterior calf wounds with less drainage right erythema and edema to toes 2-4 with less drainage drainage and odor improving no new wounds [de-identified] : HERNESTOL [de-identified] : RLE medial/posterior calf [de-identified] : cooperative

## 2024-03-20 NOTE — ASSESSMENT
[Other: _____] : [unfilled] [Arterial/Venous Disease] : arterial/venous disease [FreeTextEntry1] : Impression - venous insufficiency with slow to heal RLE wounds with improved drainage and odor   Plan Conservative medical management - leg elevation, exercise regimen, moisturize skin, calf muscle exercises, continue LLE knee high compression stockings, transition to RLE knee high 20-30 mm hg compression stockings once wounds are healed.  RLE unna boot in office 3x/week Return to office this Friday for RLE unna boot dressing change.    [Ulcer Care] : ulcer care

## 2024-03-21 NOTE — ASSESSMENT
[FreeTextEntry1] : 73-year-old male with C6EAP venous insufficiency of RLE. Has been undergoing 2x per week unna boot changes for draining ulceration of RLE ankle.  Plan: - Continue multilayer compression therapy - Would benefit from unna boot dressing changes three times per week - will schedule for MWF appts for the next two weeks

## 2024-03-21 NOTE — HISTORY OF PRESENT ILLNESS
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 He reports remote history of trauma to right heel which required local wound care by his podiatrist which eventually resolved, and similar history of right toe wound requiring local wound care by podiatry which eventually resolved. Patient now presents with right foot pain, erythema, and swelling. This first began back in June of this year at which point he noticed right foot pain while gardening, associated with weeping edema from R foot/ankle. Since then he has been intermittently seen by podiatry, completed a course of mupirocin/doxycycline, and has had intermittent local wound care. He was last seen by Podiatry two weeks ago at which point local wound care was provided and he was referred to Vascular surgery. The pain is localized to the R foot and ankle, with no aggravating or alleviating factors. He denies fevers, chills. He previously wore compression stockings but is now unable to due to the extent of the edema.  Interval History: 1/26/24: pt for fu. he had his bandage changed at podiatry earlier in the week. pt notices improvement  2/2/24. increased drainage since last week. saturated through dressing  2/9/24: improved appearance, improved odor  2/12/24: patient presents for RLE Unna boot dressing change. Reports decreased ulcer size of posterior calf and anterior shin. Reports skin over toes is more edematous and with drainage. He reports that dressing soaked through yesterday.  2/16/24: reports that drainage much improved  2/20/24: here for RLE unna boot dressing change. Drainage is improving. No new wounds  2/26/24: presents for RLE unna boot dressing change. No new complaints. Wounds and drainage are improving.  3/1/24: presents for RLE unna boot dressing change. states that drainage saturated last dressing and was leaking. wounds appear stable. drainage present.

## 2024-03-21 NOTE — PHYSICAL EXAM
[Ankle Swelling On The Right] : of the right ankle [Ankle Swelling (On Exam)] : present [de-identified] : NAD [de-identified] : Nonlabored breathing on room air [de-identified] : Regular rate [FreeTextEntry1] : Drainage noted from open wounds on RLE, erythema and swelling present

## 2024-03-22 ENCOUNTER — APPOINTMENT (OUTPATIENT)
Dept: VASCULAR SURGERY | Facility: CLINIC | Age: 74
End: 2024-03-22
Payer: MEDICARE

## 2024-03-22 VITALS
TEMPERATURE: 97.1 F | BODY MASS INDEX: 25.48 KG/M2 | HEIGHT: 69 IN | DIASTOLIC BLOOD PRESSURE: 77 MMHG | HEART RATE: 69 BPM | SYSTOLIC BLOOD PRESSURE: 152 MMHG | WEIGHT: 172 LBS

## 2024-03-22 PROCEDURE — 29580 STRAPPING UNNA BOOT: CPT | Mod: RT

## 2024-03-25 ENCOUNTER — APPOINTMENT (OUTPATIENT)
Dept: VASCULAR SURGERY | Facility: CLINIC | Age: 74
End: 2024-03-25
Payer: MEDICARE

## 2024-03-25 VITALS — DIASTOLIC BLOOD PRESSURE: 80 MMHG | HEART RATE: 76 BPM | SYSTOLIC BLOOD PRESSURE: 163 MMHG

## 2024-03-25 VITALS — TEMPERATURE: 98.4 F | DIASTOLIC BLOOD PRESSURE: 76 MMHG | SYSTOLIC BLOOD PRESSURE: 138 MMHG | HEART RATE: 72 BPM

## 2024-03-25 PROCEDURE — 99212 OFFICE O/P EST SF 10 MIN: CPT

## 2024-03-25 NOTE — ASSESSMENT
[Arterial/Venous Disease] : arterial/venous disease [Other: _____] : [unfilled] [Ulcer Care] : ulcer care [FreeTextEntry1] : Impression - venous insufficiency with slow to heal RLE wounds with improved drainage and odor   Plan Conservative medical management - leg elevation, exercise regimen, moisturize skin, calf muscle exercises, continue LLE knee high compression stockings, transition to RLE knee high 20-30 mm hg compression stockings once wounds are healed.  RLE unna boot in office 3x/week Return to office this Wednesday for RLE unna boot dressing change.

## 2024-03-25 NOTE — HISTORY OF PRESENT ILLNESS
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 He reports remote history of trauma to right heel which required local wound care by his podiatrist which eventually resolved, and similar history of right toe wound requiring local wound care by podiatry which eventually resolved. Patient now presents with right foot pain, erythema, and swelling. This first began back in June of this year at which point he noticed right foot pain while gardening, associated with weeping edema from R foot/ankle. Since then he has been intermittently seen by podiatry, completed a course of mupirocin/doxycycline, and has had intermittent local wound care. He was last seen by Podiatry two weeks ago at which point local wound care was provided and he was referred to Vascular surgery. The pain is localized to the R foot and ankle, with no aggravating or alleviating factors. He denies fevers, chills. He previously wore compression stockings but is now unable to due to the extent of the edema.   Interval History: 1/26/24: pt for fu. he had his bandage changed at podiatry earlier in the week. pt notices improvement  2/2/24. increased drainage since last week. saturated through dressing  2/9/24: improved appearance, improved odor  2/12/24: patient presents for RLE Unna boot dressing change. Reports decreased ulcer size of posterior calf and anterior shin. Reports skin over toes is more edematous and with drainage. He reports that dressing soaked through yesterday.  2/16/24: reports that drainage much improved  2/20/24: here for RLE unna boot dressing change. Drainage is improving. No new wounds  2/26/24: presents for RLE unna boot dressing change. No new complaints. Wounds and drainage are improving.  3/6/2024: RLE wounds are healing nicely. Reports xtrasorb pad was heavily saturated couple of days ago. Minimal drainage during today's visit.  No new wounds.  3/11/2024: here for RLE unna boot change. Minimal drainage. Improved odor. Wounds are slow to heal.  3/13/2024: here for RLE unna boot dressing change. No new wounds. Improved drainage.   3/18/2024: here for RLE unna boot dressing change. Improved odor and drainage  3/20/2024: here for RLE unna boot dressing change. No new complaints. Drainage is getting better.  3/25/2024: here for unna boot dressing change. Overall stable. Still draining but better. No new wounds.

## 2024-03-25 NOTE — PHYSICAL EXAM
[2+] : right 2+ [1+] : right 1+ [Ankle Swelling (On Exam)] : present [Ankle Swelling On The Right] : of the right ankle [] : bilaterally [Ankle Swelling On The Left] : moderate [Skin Ulcer] : ulcer [Alert] : alert [Oriented to Person] : oriented to person [Oriented to Time] : oriented to time [Oriented to Place] : oriented to place [Calm] : calm [Varicose Veins Of Lower Extremities] : not present [de-identified] : well appearing.   Cardiovascular: normal rate and rhythm. Femoral: right 2+ and left 2+. Palpable femoral areteries bilaterally, R PT/DP not palpable due to significant edema, R erythema and edema of first and second toes, and heel, venous stasis dermatitis of right medial leg, LLE unremarkable no wounds no skin changes no edema.   ankle edema of the right ankle   venous stasis of the right leg   Musculoskeletal: sensation and motor grossly intact in all extremities.  [de-identified] : NCAT [FreeTextEntry1] : right medial/posterior calf wounds with less drainage right erythema and edema to toes 2-4 with less drainage drainage and odor improving no new wounds [de-identified] : unlabored breathing [de-identified] : HERNESTOL [de-identified] : RLE medial/posterior calf [de-identified] : cooperative

## 2024-03-26 NOTE — HISTORY OF PRESENT ILLNESS
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 He reports remote history of trauma to right heel which required local wound care by his podiatrist which eventually resolved, and similar history of right toe wound requiring local wound care by podiatry which eventually resolved. Patient now presents with right foot pain, erythema, and swelling. This first began back in June of this year at which point he noticed right foot pain while gardening, associated with weeping edema from R foot/ankle. Since then he has been intermittently seen by podiatry, completed a course of mupirocin/doxycycline, and has had intermittent local wound care. He was last seen by Podiatry two weeks ago at which point local wound care was provided and he was referred to Vascular surgery. The pain is localized to the R foot and ankle, with no aggravating or alleviating factors. He denies fevers, chills. He previously wore compression stockings but is now unable to due to the extent of the edema.  Interval History: 1/26/24: pt for fu. he had his bandage changed at podiatry earlier in the week. pt notices improvement  2/2/24. increased drainage since last week. saturated through dressing  2/9/24: improved appearance, improved odor  2/12/24: patient presents for RLE Unna boot dressing change. Reports decreased ulcer size of posterior calf and anterior shin. Reports skin over toes is more edematous and with drainage. He reports that dressing soaked through yesterday.  2/16/24: reports that drainage much improved  2/20/24: here for RLE unna boot dressing change. Drainage is improving. No new wounds  2/26/24: presents for RLE unna boot dressing change. No new complaints. Wounds and drainage are improving.  3/6/2024: RLE wounds are healing nicely. Reports xtrasorb pad was heavily saturated couple of days ago. Minimal drainage during today's visit. No new wounds.  3/11/2024: here for RLE unna boot change. Minimal drainage. Improved odor. Wounds are slow to heal.  3/13/2024: here for RLE unna boot dressing change. No new wounds. Improved drainage.  3/18/2024: here for RLE unna boot dressing change. Improved odor and drainage  3/20/2024: here for RLE unna boot dressing change. No new complaints. Drainage is getting better.  3/22/2024: stable exam

## 2024-03-26 NOTE — ASSESSMENT
[FreeTextEntry1] : Impression - venous insufficiency with slow to heal RLE wounds with improved drainage and odor   Plan Conservative medical management - leg elevation, exercise regimen, moisturize skin, calf muscle exercises, continue LLE knee high compression stockings, transition to RLE knee high 20-30 mm hg compression stockings once wounds are healed. RLE unna boot in office 3x/week Return to office next week for RLE unna boot dressing change.

## 2024-03-26 NOTE — END OF VISIT
[] : Resident [FreeTextEntry3] : wound cleansed.  unna, foam, compression.  cont 3x/week until drainage better controlled

## 2024-03-27 ENCOUNTER — APPOINTMENT (OUTPATIENT)
Dept: VASCULAR SURGERY | Facility: CLINIC | Age: 74
End: 2024-03-27
Payer: MEDICARE

## 2024-03-27 VITALS — HEART RATE: 67 BPM | SYSTOLIC BLOOD PRESSURE: 143 MMHG | DIASTOLIC BLOOD PRESSURE: 82 MMHG

## 2024-03-27 VITALS
DIASTOLIC BLOOD PRESSURE: 95 MMHG | TEMPERATURE: 97.7 F | HEART RATE: 65 BPM | HEIGHT: 69 IN | SYSTOLIC BLOOD PRESSURE: 145 MMHG | BODY MASS INDEX: 25.18 KG/M2 | WEIGHT: 170 LBS

## 2024-03-27 PROCEDURE — 99212 OFFICE O/P EST SF 10 MIN: CPT | Mod: 25

## 2024-03-27 PROCEDURE — 29580 STRAPPING UNNA BOOT: CPT | Mod: RT

## 2024-03-27 NOTE — PHYSICAL EXAM
[2+] : right 2+ [1+] : right 1+ [Ankle Swelling (On Exam)] : present [Ankle Swelling On The Right] : of the right ankle [Varicose Veins Of Lower Extremities] : not present [] : bilaterally [Ankle Swelling On The Left] : moderate [Alert] : alert [Skin Ulcer] : ulcer [Oriented to Person] : oriented to person [Oriented to Place] : oriented to place [Oriented to Time] : oriented to time [Calm] : calm [de-identified] : well appearing.   Cardiovascular: normal rate and rhythm. Femoral: right 2+ and left 2+. Palpable femoral areteries bilaterally, R PT/DP not palpable due to significant edema, R erythema and edema of first and second toes, and heel, venous stasis dermatitis of right medial leg, LLE unremarkable no wounds no skin changes no edema.   ankle edema of the right ankle   venous stasis of the right leg   Musculoskeletal: sensation and motor grossly intact in all extremities.  [de-identified] : NCAT [de-identified] : unlabored breathing [FreeTextEntry1] : right medial/posterior calf wounds with less drainage right erythema and edema to toes 2-4 with less drainage drainage and odor improving no new wounds [de-identified] : RLE medial/posterior calf/ heal [de-identified] : HERNESTOL [de-identified] : cooperative

## 2024-03-27 NOTE — ASSESSMENT
[FreeTextEntry1] : Impression - venous insufficiency with slow to heal RLE wounds with improved drainage and odor   Plan Conservative medical management - leg elevation, exercise regimen, moisturize skin, calf muscle exercises, continue LLE knee high compression stockings, transition to RLE knee high 20-30 mm hg compression stockings once wounds are healed.  RLE unna boot in office 3x/week Return to office this Friday for RLE unna boot dressing change.    [Arterial/Venous Disease] : arterial/venous disease [Other: _____] : [unfilled] [Ulcer Care] : ulcer care

## 2024-03-27 NOTE — HISTORY OF PRESENT ILLNESS
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 He reports remote history of trauma to right heel which required local wound care by his podiatrist which eventually resolved, and similar history of right toe wound requiring local wound care by podiatry which eventually resolved. Patient now presents with right foot pain, erythema, and swelling. This first began back in June of this year at which point he noticed right foot pain while gardening, associated with weeping edema from R foot/ankle. Since then he has been intermittently seen by podiatry, completed a course of mupirocin/doxycycline, and has had intermittent local wound care. He was last seen by Podiatry two weeks ago at which point local wound care was provided and he was referred to Vascular surgery. The pain is localized to the R foot and ankle, with no aggravating or alleviating factors. He denies fevers, chills. He previously wore compression stockings but is now unable to due to the extent of the edema.   Interval History: 1/26/24: pt for fu. he had his bandage changed at podiatry earlier in the week. pt notices improvement  2/2/24. increased drainage since last week. saturated through dressing  2/9/24: improved appearance, improved odor  2/12/24: patient presents for RLE Unna boot dressing change. Reports decreased ulcer size of posterior calf and anterior shin. Reports skin over toes is more edematous and with drainage. He reports that dressing soaked through yesterday.  2/16/24: reports that drainage much improved  2/20/24: here for RLE unna boot dressing change. Drainage is improving. No new wounds  2/26/24: presents for RLE unna boot dressing change. No new complaints. Wounds and drainage are improving.  3/6/2024: RLE wounds are healing nicely. Reports xtrasorb pad was heavily saturated couple of days ago. Minimal drainage during today's visit.  No new wounds.  3/11/2024: here for RLE unna boot change. Minimal drainage. Improved odor. Wounds are slow to heal.  3/13/2024: here for RLE unna boot dressing change. No new wounds. Improved drainage.   3/18/2024: here for RLE unna boot dressing change. Improved odor and drainage  3/20/2024: here for RLE unna boot dressing change. No new complaints. Drainage is getting better.  3/25/2024: here for unna boot dressing change. Overall stable. Still draining but better. No new wounds.  3/27/2024 presents for RLE unna boot dressing change. Stable. No new complaints. Less drainage.

## 2024-03-29 ENCOUNTER — APPOINTMENT (OUTPATIENT)
Dept: VASCULAR SURGERY | Facility: CLINIC | Age: 74
End: 2024-03-29
Payer: MEDICARE

## 2024-03-29 VITALS
WEIGHT: 170 LBS | HEIGHT: 69 IN | SYSTOLIC BLOOD PRESSURE: 164 MMHG | TEMPERATURE: 97.7 F | HEART RATE: 71 BPM | BODY MASS INDEX: 25.18 KG/M2 | DIASTOLIC BLOOD PRESSURE: 73 MMHG

## 2024-03-29 PROCEDURE — 29580 STRAPPING UNNA BOOT: CPT | Mod: RT

## 2024-03-29 NOTE — END OF VISIT
[] : Resident [FreeTextEntry3] : as above, plan wound care eval, given minimal improvement wound cleansed, unna, foam, abd, compression

## 2024-03-29 NOTE — HISTORY OF PRESENT ILLNESS
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 He reports remote history of trauma to right heel which required local wound care by his podiatrist which eventually resolved, and similar history of right toe wound requiring local wound care by podiatry which eventually resolved. Patient now presents with right foot pain, erythema, and swelling. This first began back in June of this year at which point he noticed right foot pain while gardening, associated with weeping edema from R foot/ankle. Since then he has been intermittently seen by podiatry, completed a course of mupirocin/doxycycline, and has had intermittent local wound care. He was last seen by Podiatry two weeks ago at which point local wound care was provided and he was referred to Vascular surgery. The pain is localized to the R foot and ankle, with no aggravating or alleviating factors. He denies fevers, chills. He previously wore compression stockings but is now unable to due to the extent of the edema.  Interval History: 1/26/24: pt for fu. he had his bandage changed at podiatry earlier in the week. pt notices improvement  2/2/24. increased drainage since last week. saturated through dressing  2/9/24: improved appearance, improved odor  2/12/24: patient presents for RLE Unna boot dressing change. Reports decreased ulcer size of posterior calf and anterior shin. Reports skin over toes is more edematous and with drainage. He reports that dressing soaked through yesterday.  2/16/24: reports that drainage much improved  2/20/24: here for RLE unna boot dressing change. Drainage is improving. No new wounds  2/26/24: presents for RLE unna boot dressing change. No new complaints. Wounds and drainage are improving.  3/6/2024: RLE wounds are healing nicely. Reports xtrasorb pad was heavily saturated couple of days ago. Minimal drainage during today's visit. No new wounds.  3/11/2024: here for RLE unna boot change. Minimal drainage. Improved odor. Wounds are slow to heal.  3/13/2024: here for RLE unna boot dressing change. No new wounds. Improved drainage.  3/18/2024: here for RLE unna boot dressing change. Improved odor and drainage  3/20/2024: here for RLE unna boot dressing change. No new complaints. Drainage is getting better.  3/25/2024: here for unna boot dressing change. Overall stable. Still draining but better. No new wounds.  3/27/2024 presents for RLE unna boot dressing change. Stable. No new complaints. Less drainage.  3/29/2024: presents for RLE unna boot dressing change. Increased drainage with saturation of dressing. Wound does not appear to be improving with compression alone.

## 2024-03-29 NOTE — ASSESSMENT
[FreeTextEntry1] : 73-year-old male with CEAP6 venous insufficiency of RLE. Undergoing multilayer compression therapy for wound of RLE.  Increasing drainage and limited improvement from wound.  Plan: - Recommend wound care evaluation - Continue MWF unna boot changes pending wound care recs

## 2024-03-29 NOTE — PHYSICAL EXAM
[de-identified] : NAD [de-identified] : Nonlabored breathing on room air [FreeTextEntry1] : Draining RLE wound Granulation tissue present at ankle [de-identified] : Regular rate

## 2024-04-01 ENCOUNTER — APPOINTMENT (OUTPATIENT)
Dept: VASCULAR SURGERY | Facility: CLINIC | Age: 74
End: 2024-04-01
Payer: MEDICARE

## 2024-04-01 VITALS
HEIGHT: 69 IN | SYSTOLIC BLOOD PRESSURE: 161 MMHG | WEIGHT: 170 LBS | HEART RATE: 88 BPM | DIASTOLIC BLOOD PRESSURE: 79 MMHG | TEMPERATURE: 98.1 F | BODY MASS INDEX: 25.18 KG/M2

## 2024-04-01 PROCEDURE — 29580 STRAPPING UNNA BOOT: CPT | Mod: RT

## 2024-04-01 PROCEDURE — 99212 OFFICE O/P EST SF 10 MIN: CPT | Mod: 25

## 2024-04-01 NOTE — ASSESSMENT
[Arterial/Venous Disease] : arterial/venous disease [Other: _____] : [unfilled] [Ulcer Care] : ulcer care [FreeTextEntry1] : Impression - venous insufficiency with slow to heal RLE wounds    Plan Conservative medical management - leg elevation, exercise regimen, moisturize skin, calf muscle exercises, continue LLE knee high compression stockings, transition to RLE knee high 20-30 mm hg compression stockings once wounds are healed.  Waiting to be evaluated by wound care Return to office this Wednesday for RLE unna boot dressing change.

## 2024-04-01 NOTE — PHYSICAL EXAM
[2+] : right 2+ [1+] : right 1+ [Ankle Swelling (On Exam)] : present [Ankle Swelling On The Right] : of the right ankle [] : present [Ankle Swelling On The Left] : moderate [Oriented to Person] : oriented to person [Alert] : alert [Skin Ulcer] : ulcer [Oriented to Time] : oriented to time [Oriented to Place] : oriented to place [Calm] : calm [Varicose Veins Of Lower Extremities] : not present [de-identified] : well appearing.   Cardiovascular: normal rate and rhythm. Femoral: right 2+ and left 2+. Palpable femoral areteries bilaterally, R PT/DP not palpable due to significant edema, R erythema and edema of first and second toes, and heel, venous stasis dermatitis of right medial leg, LLE unremarkable no wounds no skin changes no edema.   ankle edema of the right ankle   venous stasis of the right leg   Musculoskeletal: sensation and motor grossly intact in all extremities.  [de-identified] : unlabored breathing [de-identified] : NCAT [de-identified] : HERNESTOL [de-identified] : RLE medial/posterior calf/ heel [de-identified] : cooperative

## 2024-04-01 NOTE — HISTORY OF PRESENT ILLNESS
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 He reports remote history of trauma to right heel which required local wound care by his podiatrist which eventually resolved, and similar history of right toe wound requiring local wound care by podiatry which eventually resolved. Patient now presents with right foot pain, erythema, and swelling. This first began back in June of this year at which point he noticed right foot pain while gardening, associated with weeping edema from R foot/ankle. Since then he has been intermittently seen by podiatry, completed a course of mupirocin/doxycycline, and has had intermittent local wound care. He was last seen by Podiatry two weeks ago at which point local wound care was provided and he was referred to Vascular surgery. The pain is localized to the R foot and ankle, with no aggravating or alleviating factors. He denies fevers, chills. He previously wore compression stockings but is now unable to due to the extent of the edema.   Interval History: 1/26/24: pt for fu. he had his bandage changed at podiatry earlier in the week. pt notices improvement  2/2/24. increased drainage since last week. saturated through dressing  2/9/24: improved appearance, improved odor  2/12/24: patient presents for RLE Unna boot dressing change. Reports decreased ulcer size of posterior calf and anterior shin. Reports skin over toes is more edematous and with drainage. He reports that dressing soaked through yesterday.  2/16/24: reports that drainage much improved  2/20/24: here for RLE unna boot dressing change. Drainage is improving. No new wounds  2/26/24: presents for RLE unna boot dressing change. No new complaints. Wounds and drainage are improving.  3/6/2024: RLE wounds are healing nicely. Reports xtrasorb pad was heavily saturated couple of days ago. Minimal drainage during today's visit.  No new wounds.  3/11/2024: here for RLE unna boot change. Minimal drainage. Improved odor. Wounds are slow to heal.  3/13/2024: here for RLE unna boot dressing change. No new wounds. Improved drainage.   3/18/2024: here for RLE unna boot dressing change. Improved odor and drainage  3/20/2024: here for RLE unna boot dressing change. No new complaints. Drainage is getting better.  3/25/2024: here for unna boot dressing change. Overall stable. Still draining but better. No new wounds.  3/27/2024 presents for RLE unna boot dressing change. Stable. No new complaints. Less drainage.  4/1/2024 here for unna boot dressing change. States more drainage today. Pt was on his feet a lot yesterday without adequate rest break and leg elevation. Right distal calf and foot appear to be more red. Pt is waiting to hear back from wound care center for appointment

## 2024-04-03 ENCOUNTER — APPOINTMENT (OUTPATIENT)
Dept: VASCULAR SURGERY | Facility: CLINIC | Age: 74
End: 2024-04-03

## 2024-04-05 ENCOUNTER — APPOINTMENT (OUTPATIENT)
Dept: WOUND CARE | Facility: HOSPITAL | Age: 74
End: 2024-04-05
Payer: MEDICARE

## 2024-04-05 ENCOUNTER — OUTPATIENT (OUTPATIENT)
Dept: OUTPATIENT SERVICES | Facility: HOSPITAL | Age: 74
LOS: 1 days | End: 2024-04-05

## 2024-04-05 ENCOUNTER — APPOINTMENT (OUTPATIENT)
Dept: VASCULAR SURGERY | Facility: CLINIC | Age: 74
End: 2024-04-05

## 2024-04-05 PROCEDURE — 93923 UPR/LXTR ART STDY 3+ LVLS: CPT

## 2024-04-05 PROCEDURE — G0463: CPT

## 2024-04-05 PROCEDURE — 99205 OFFICE O/P NEW HI 60 MIN: CPT

## 2024-04-05 NOTE — HISTORY OF PRESENT ILLNESS
[FreeTextEntry1] :  Mr. ALFREDO HAWK   presents to the office with a wound for about 12 weeks duration.  The wound is located on the RLE . The patient has complaints of drainage   The patient has been dressing the wound with Unna boot at vascular.  The patient denies fevers or chills.  The patient has localized pain to the wound upon dressing changes.  The patient has no other complaints or associated symptoms.    Pt follows with Vascular Dr. Cuellar.  Pt wears compression as per Vascular on LLE and has been wrapped by Vascular with an Unna boot.

## 2024-04-05 NOTE — PHYSICAL EXAM
[Skin Ulcer] : ulcer [Alert] : alert [Oriented to Person] : oriented to person [Oriented to Place] : oriented to place [Oriented to Time] : oriented to time [Calm] : calm [Please See PDF for Tissue Analytics] : Please See PDF for Tissue Analytics. [1+] : left 1+ [Ankle Swelling (On Exam)] : present [Ankle Swelling On The Right] : of the right ankle [] : bilaterally [Ankle Swelling On The Left] : moderate [de-identified] : NAD [de-identified] : AT [de-identified] : supple [de-identified] : equal chest rise [de-identified] : soft NT [de-identified] : FROM

## 2024-04-05 NOTE — PLAN
[FreeTextEntry1] : 4-5-24: Plan: wash with soap and water drawtex/ rubio/ace or circaid.  Multilayer applied today. Remove in 1 week or sooner if saturated and use circiad.every other day. Will order circaid.with foot piece.for RLE and circiad for LLE  Rx given circaids off at night  Moisturize intact skin. Do not put between toes.  supplies ordered Pt does not want a Home Care nurse.  Pt to change dressing himself.  further care as per vascular.  will remain available as needed.  f/u 2 weeks with Dr. Cuellar  (d/w with Dr. Cuellar).

## 2024-04-05 NOTE — ASSESSMENT
[FreeTextEntry1] : 4-5-24: Pt here for consultation for weeping from Rt foot Pt follows with vascular and has a Unna boot changed 3x/week Wears compression on LLE.  Unable to wear compression sock on RLE at this time On exam:  RLE:  dry skin at level of foot anteriorly and superficial denuded skin posteriorly at heel and ankle.which pt says weeps. Minimal drainage today..  Periwound erythema, w/o warmth, tenderness, induration, fluctuance or crepitus. s/p mechanical debridement

## 2024-04-08 ENCOUNTER — APPOINTMENT (OUTPATIENT)
Dept: VASCULAR SURGERY | Facility: CLINIC | Age: 74
End: 2024-04-08

## 2024-04-11 ENCOUNTER — APPOINTMENT (OUTPATIENT)
Dept: VASCULAR SURGERY | Facility: CLINIC | Age: 74
End: 2024-04-11
Payer: MEDICARE

## 2024-04-11 VITALS
WEIGHT: 172 LBS | HEIGHT: 69 IN | BODY MASS INDEX: 25.48 KG/M2 | DIASTOLIC BLOOD PRESSURE: 75 MMHG | HEART RATE: 80 BPM | SYSTOLIC BLOOD PRESSURE: 149 MMHG | TEMPERATURE: 98.2 F

## 2024-04-11 DIAGNOSIS — I82.409 ACUTE EMBOLISM AND THROMBOSIS OF UNSPECIFIED DEEP VEINS OF UNSPECIFIED LOWER EXTREMITY: ICD-10-CM

## 2024-04-11 DIAGNOSIS — M79.89 OTHER SPECIFIED SOFT TISSUE DISORDERS: ICD-10-CM

## 2024-04-11 PROCEDURE — 99203 OFFICE O/P NEW LOW 30 MIN: CPT

## 2024-04-15 ENCOUNTER — APPOINTMENT (OUTPATIENT)
Dept: VASCULAR SURGERY | Facility: CLINIC | Age: 74
End: 2024-04-15
Payer: MEDICARE

## 2024-04-15 VITALS
SYSTOLIC BLOOD PRESSURE: 152 MMHG | DIASTOLIC BLOOD PRESSURE: 82 MMHG | HEART RATE: 76 BPM | BODY MASS INDEX: 25.48 KG/M2 | WEIGHT: 172 LBS | HEIGHT: 69 IN

## 2024-04-15 PROCEDURE — 29580 STRAPPING UNNA BOOT: CPT | Mod: RT

## 2024-04-15 PROCEDURE — 99213 OFFICE O/P EST LOW 20 MIN: CPT | Mod: 25

## 2024-04-15 NOTE — HISTORY OF PRESENT ILLNESS
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 He reports remote history of trauma to right heel which required local wound care by his podiatrist which eventually resolved, and similar history of right toe wound requiring local wound care by podiatry which eventually resolved. Patient now presents with right foot pain, erythema, and swelling. This first began back in June of this year at which point he noticed right foot pain while gardening, associated with weeping edema from R foot/ankle. Since then he has been intermittently seen by podiatry, completed a course of mupirocin/doxycycline, and has had intermittent local wound care. He was last seen by Podiatry two weeks ago at which point local wound care was provided and he was referred to Vascular surgery. The pain is localized to the R foot and ankle, with no aggravating or alleviating factors. He denies fevers, chills. He previously wore compression stockings but is now unable to due to the extent of the edema.  Interval History: 1/26/24: pt for fu. he had his bandage changed at podiatry earlier in the week. pt notices improvement  2/2/24. increased drainage since last week. saturated through dressing  2/9/24: improved appearance, improved odor  2/12/24: patient presents for RLE Unna boot dressing change. Reports decreased ulcer size of posterior calf and anterior shin. Reports skin over toes is more edematous and with drainage. He reports that dressing soaked through yesterday.  2/16/24: reports that drainage much improved  2/20/24: here for RLE unna boot dressing change. Drainage is improving. No new wounds  2/26/24: presents for RLE unna boot dressing change. No new complaints. Wounds and drainage are improving.  3/6/2024: RLE wounds are healing nicely. Reports xtrasorb pad was heavily saturated couple of days ago. Minimal drainage during today's visit. No new wounds.  3/11/2024: here for RLE unna boot change. Minimal drainage. Improved odor. Wounds are slow to heal.  3/13/2024: here for RLE unna boot dressing change. No new wounds. Improved drainage.  3/18/2024: here for RLE unna boot dressing change. Improved odor and drainage  3/20/2024: here for RLE unna boot dressing change. No new complaints. Drainage is getting better.  3/25/2024: here for unna boot dressing change. Overall stable. Still draining but better. No new wounds.  3/27/2024 presents for RLE unna boot dressing change. Stable. No new complaints. Less drainage.  3/29/2024: presents for RLE unna boot dressing change. Increased drainage with saturation of dressing. Wound does not appear to be improving with compression alone. [de-identified] : 4/15/24: Patient presents for RLE Unna boot dressing change. He reports decreased erythema and edema. He reports continued drainage, particularly from posterior right leg.

## 2024-04-15 NOTE — PHYSICAL EXAM
[de-identified] : NAD [de-identified] : Nonlabored breathing on room air [de-identified] : Regular rate [FreeTextEntry1] : Draining RLE wound Granulation tissue present at ankle

## 2024-04-15 NOTE — ASSESSMENT
[FreeTextEntry1] : 73-year-old male with CEAP6 venous insufficiency of RLE. Undergoing multilayer compression therapy for wound of RLE.  Unna boot dressing was placed on right leg.   Plan: - Continue MWF unna boot changes

## 2024-04-18 NOTE — ASSESSMENT
[FreeTextEntry1] : Impression - venous insufficiency with slow to heal RLE wounds    Plan Conservative medical management - leg elevation, exercise regimen, moisturize skin, calf muscle exercises, continue LLE knee high compression stockings, transition to RLE knee high 20-30 mm hg compression stockings once wounds are healed.  Waiting to be evaluated by wound care Return to office this Wednesday for RLE unna boot dressing change.    [Arterial/Venous Disease] : arterial/venous disease [Other: _____] : [unfilled] [Ulcer Care] : ulcer care

## 2024-04-18 NOTE — PHYSICAL EXAM
[2+] : right 2+ [1+] : right 1+ [Ankle Swelling (On Exam)] : present [Ankle Swelling On The Right] : of the right ankle [Varicose Veins Of Lower Extremities] : not present [] : bilaterally [Ankle Swelling On The Left] : moderate [Skin Ulcer] : ulcer [Alert] : alert [Oriented to Person] : oriented to person [Oriented to Place] : oriented to place [Oriented to Time] : oriented to time [Calm] : calm [de-identified] : well appearing.   Cardiovascular: normal rate and rhythm. Femoral: right 2+ and left 2+. Palpable femoral areteries bilaterally, R PT/DP not palpable due to significant edema, R erythema and edema of first and second toes, and heel, venous stasis dermatitis of right medial leg, LLE unremarkable no wounds no skin changes no edema.   ankle edema of the right ankle   venous stasis of the right leg   Musculoskeletal: sensation and motor grossly intact in all extremities.  [de-identified] : NCAT [de-identified] : unlabored breathing [de-identified] : HERNESTOL [de-identified] : RLE medial/posterior calf/ heel [de-identified] : cooperative

## 2024-04-19 ENCOUNTER — APPOINTMENT (OUTPATIENT)
Dept: VASCULAR SURGERY | Facility: CLINIC | Age: 74
End: 2024-04-19
Payer: MEDICARE

## 2024-04-19 VITALS
SYSTOLIC BLOOD PRESSURE: 137 MMHG | HEIGHT: 69 IN | HEART RATE: 67 BPM | WEIGHT: 172 LBS | BODY MASS INDEX: 25.48 KG/M2 | DIASTOLIC BLOOD PRESSURE: 76 MMHG | TEMPERATURE: 98.2 F

## 2024-04-19 PROCEDURE — 29580 STRAPPING UNNA BOOT: CPT | Mod: RT

## 2024-04-19 NOTE — PHYSICAL EXAM
[de-identified] : well appearing.   Cardiovascular: normal rate and rhythm. Femoral: right 2+ and left 2+. Palpable femoral areteries bilaterally, R PT/DP not palpable due to significant edema, R erythema and edema of first and second toes, and heel, venous stasis dermatitis of right medial leg, LLE unremarkable no wounds no skin changes no edema.   ankle edema of the right ankle   venous stasis of the right leg   Musculoskeletal: sensation and motor grossly intact in all extremities.

## 2024-04-19 NOTE — HISTORY OF PRESENT ILLNESS
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 He reports remote history of trauma to right heel which required local wound care by his podiatrist which eventually resolved, and similar history of right toe wound requiring local wound care by podiatry which eventually resolved. Patient now presents with right foot pain, erythema, and swelling. This first began back in June of this year at which point he noticed right foot pain while gardening, associated with weeping edema from R foot/ankle. Since then he has been intermittently seen by podiatry, completed a course of mupirocin/doxycycline, and has had intermittent local wound care. He was last seen by Podiatry two weeks ago at which point local wound care was provided and he was referred to Vascular surgery. The pain is localized to the R foot and ankle, with no aggravating or alleviating factors. He denies fevers, chills. He previously wore compression stockings but is now unable to due to the extent of the edema.  [de-identified] : 1/26/24: pt for fu.  he had his bandage changed at podiatry earlier in the week.  pt notices improvement  2/2/24.  increased  drainage since last week.  saturated through dressing  2/9/24: improved appearance, improved odor  2/12/24: patient presents for RLE Unna boot dressing change. Reports decreased ulcer size of posterior calf and anterior shin. Reports skin over toes is more edematous and with drainage. He reports that dressing soaked through yesterday.  2/16/24: reports that drainage much improved  2/23/24: heel is bothering him, somewhat increased drainage  2/26/24: presents for RLE unna boot dressing change. No new complaints. Wounds and drainage are improving.  3/6/2024: RLE wounds are healing nicely. Reports xtrasorb pad was heavily saturated couple of days ago. Minimal drainage during today's visit. No new wounds.  3/8/24: drainage persists, overall improved  3/15/24: improved odor/drainage  3/20/2024: here for RLE unna boot dressing change. No new complaints. Drainage is getting better.  3/25/2024: here for unna boot dressing change. Overall stable. Still draining but better. No new wounds.  3/27/2024 presents for RLE unna boot dressing change. Stable. No new complaints. Less drainage.  3/29/2024: presents for RLE unna boot dressing change. Increased drainage with saturation of dressing. Wound does not appear to be improving with compression alone.   Interval History: 4/15/24: Patient presents for RLE Unna boot dressing change. He reports decreased erythema and edema. He reports continued drainage, particularly from posterior right leg.  4/19: for unna change.  decreased drainage

## 2024-04-23 ENCOUNTER — APPOINTMENT (OUTPATIENT)
Dept: VASCULAR SURGERY | Facility: CLINIC | Age: 74
End: 2024-04-23
Payer: MEDICARE

## 2024-04-23 VITALS
DIASTOLIC BLOOD PRESSURE: 80 MMHG | TEMPERATURE: 98 F | WEIGHT: 172 LBS | HEART RATE: 73 BPM | SYSTOLIC BLOOD PRESSURE: 152 MMHG | BODY MASS INDEX: 25.48 KG/M2 | HEIGHT: 69 IN

## 2024-04-23 PROCEDURE — 99212 OFFICE O/P EST SF 10 MIN: CPT | Mod: 25

## 2024-04-23 PROCEDURE — 29580 STRAPPING UNNA BOOT: CPT | Mod: RT

## 2024-04-23 NOTE — ASSESSMENT
[FreeTextEntry1] : Impression - venous insufficiency with slow to heal RLE wounds with drainage   Plan Conservative medical management - leg elevation, exercise regimen, moisturize skin, calf muscle exercises, continue LLE knee high compression stockings, transition to RLE knee high 20-30 mm hg compression stockings once wounds are healed.  Recommend derm eval Return to office this Friday for RLE unna boot dressing change.    [Arterial/Venous Disease] : arterial/venous disease [Other: _____] : [unfilled] [Ulcer Care] : ulcer care

## 2024-04-23 NOTE — PHYSICAL EXAM
[de-identified] : well appearing.   Cardiovascular: normal rate and rhythm. Femoral: right 2+ and left 2+. Palpable femoral areteries bilaterally, R PT/DP not palpable due to significant edema, R erythema and edema of first and second toes, and heel, venous stasis dermatitis of right medial leg, LLE unremarkable no wounds no skin changes no edema.   ankle edema of the right ankle   venous stasis of the right leg   Musculoskeletal: sensation and motor grossly intact in all extremities.

## 2024-04-23 NOTE — HISTORY OF PRESENT ILLNESS
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 He reports remote history of trauma to right heel which required local wound care by his podiatrist which eventually resolved, and similar history of right toe wound requiring local wound care by podiatry which eventually resolved. Patient now presents with right foot pain, erythema, and swelling. This first began back in June of this year at which point he noticed right foot pain while gardening, associated with weeping edema from R foot/ankle. Since then he has been intermittently seen by podiatry, completed a course of mupirocin/doxycycline, and has had intermittent local wound care. He was last seen by Podiatry two weeks ago at which point local wound care was provided and he was referred to Vascular surgery. The pain is localized to the R foot and ankle, with no aggravating or alleviating factors. He denies fevers, chills. He previously wore compression stockings but is now unable to due to the extent of the edema.  [de-identified] : 1/26/24: pt for fu.  he had his bandage changed at podiatry earlier in the week.  pt notices improvement  2/2/24.  increased  drainage since last week.  saturated through dressing  2/9/24: improved appearance, improved odor  2/12/24: patient presents for RLE Unna boot dressing change. Reports decreased ulcer size of posterior calf and anterior shin. Reports skin over toes is more edematous and with drainage. He reports that dressing soaked through yesterday.  2/16/24: reports that drainage much improved  2/23/24: heel is bothering him, somewhat increased drainage  2/26/24: presents for RLE unna boot dressing change. No new complaints. Wounds and drainage are improving.  3/6/2024: RLE wounds are healing nicely. Reports xtrasorb pad was heavily saturated couple of days ago. Minimal drainage during today's visit. No new wounds.  3/8/24: drainage persists, overall improved  3/15/24: improved odor/drainage  3/20/2024: here for RLE unna boot dressing change. No new complaints. Drainage is getting better.  3/25/2024: here for unna boot dressing change. Overall stable. Still draining but better. No new wounds.  3/27/2024 presents for RLE unna boot dressing change. Stable. No new complaints. Less drainage.  3/29/2024: presents for RLE unna boot dressing change. Increased drainage with saturation of dressing. Wound does not appear to be improving with compression alone.   Interval History: 4/15/24: Patient presents for RLE Unna boot dressing change. He reports decreased erythema and edema. He reports continued drainage, particularly from posterior right leg.  4/19: for unna change.  decreased drainage  4/23/24: here for RLE unna boot change. drainage stable. does not elevate his legs frequently. no new wounds or ulcerations. Has not seen derm for eval

## 2024-04-25 DIAGNOSIS — L97.919 NON-PRESSURE CHRONIC ULCER OF UNSPECIFIED PART OF RIGHT LOWER LEG WITH UNSPECIFIED SEVERITY: ICD-10-CM

## 2024-04-25 DIAGNOSIS — I83.019 VARICOSE VEINS OF RIGHT LOWER EXTREMITY WITH ULCER OF UNSPECIFIED SITE: ICD-10-CM

## 2024-04-26 ENCOUNTER — APPOINTMENT (OUTPATIENT)
Dept: VASCULAR SURGERY | Facility: CLINIC | Age: 74
End: 2024-04-26
Payer: MEDICARE

## 2024-04-26 VITALS
TEMPERATURE: 97.8 F | DIASTOLIC BLOOD PRESSURE: 75 MMHG | WEIGHT: 172 LBS | HEIGHT: 69 IN | BODY MASS INDEX: 25.48 KG/M2 | SYSTOLIC BLOOD PRESSURE: 155 MMHG | HEART RATE: 82 BPM

## 2024-04-26 VITALS — SYSTOLIC BLOOD PRESSURE: 125 MMHG | DIASTOLIC BLOOD PRESSURE: 81 MMHG | HEART RATE: 90 BPM

## 2024-04-26 PROCEDURE — 29580 STRAPPING UNNA BOOT: CPT | Mod: RT

## 2024-04-26 PROCEDURE — 99212 OFFICE O/P EST SF 10 MIN: CPT | Mod: 25

## 2024-04-26 NOTE — ASSESSMENT
[FreeTextEntry1] : Impression - venous insufficiency with slow to heal RLE wounds with drainage   Plan Conservative medical management - leg elevation, exercise regimen, moisturize skin, calf muscle exercises, continue LLE knee high compression stockings, transition to RLE knee high 20-30 mm hg compression stockings once wounds are healed.  Dermatology follow up next Monday Will consider SCD pump Return to office next Monday for RLE unna boot dressing change.    [Arterial/Venous Disease] : arterial/venous disease [Other: _____] : [unfilled] [Ulcer Care] : ulcer care

## 2024-04-26 NOTE — HISTORY OF PRESENT ILLNESS
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 He reports remote history of trauma to right heel which required local wound care by his podiatrist which eventually resolved, and similar history of right toe wound requiring local wound care by podiatry which eventually resolved. Patient now presents with right foot pain, erythema, and swelling. This first began back in June of this year at which point he noticed right foot pain while gardening, associated with weeping edema from R foot/ankle. Since then he has been intermittently seen by podiatry, completed a course of mupirocin/doxycycline, and has had intermittent local wound care. He was last seen by Podiatry two weeks ago at which point local wound care was provided and he was referred to Vascular surgery. The pain is localized to the R foot and ankle, with no aggravating or alleviating factors. He denies fevers, chills. He previously wore compression stockings but is now unable to due to the extent of the edema.  [de-identified] : 1/26/24: pt for fu.  he had his bandage changed at podiatry earlier in the week.  pt notices improvement  2/2/24.  increased  drainage since last week.  saturated through dressing  2/9/24: improved appearance, improved odor  2/12/24: patient presents for RLE Unna boot dressing change. Reports decreased ulcer size of posterior calf and anterior shin. Reports skin over toes is more edematous and with drainage. He reports that dressing soaked through yesterday.  2/16/24: reports that drainage much improved  2/23/24: heel is bothering him, somewhat increased drainage  2/26/24: presents for RLE unna boot dressing change. No new complaints. Wounds and drainage are improving.  3/6/2024: RLE wounds are healing nicely. Reports xtrasorb pad was heavily saturated couple of days ago. Minimal drainage during today's visit. No new wounds.  3/8/24: drainage persists, overall improved  3/15/24: improved odor/drainage  3/20/2024: here for RLE unna boot dressing change. No new complaints. Drainage is getting better.  3/25/2024: here for unna boot dressing change. Overall stable. Still draining but better. No new wounds.  3/27/2024 presents for RLE unna boot dressing change. Stable. No new complaints. Less drainage.  3/29/2024: presents for RLE unna boot dressing change. Increased drainage with saturation of dressing. Wound does not appear to be improving with compression alone.   Interval History: 4/15/24: Patient presents for RLE Unna boot dressing change. He reports decreased erythema and edema. He reports continued drainage, particularly from posterior right leg.  4/19: for unna change.  decreased drainage  4/23/24: here for RLE unna boot change. drainage stable. does not elevate his legs frequently. no new wounds or ulcerations. Has not seen derm for eval   4/26/24: here for unna boot change. drainage persists. states there's more drainage over the past couple of days. no adequate rest break to elevate his legs. scheduled to see derm next Monday

## 2024-04-26 NOTE — PHYSICAL EXAM
[de-identified] : well appearing.   Cardiovascular: normal rate and rhythm. Femoral: right 2+ and left 2+. Palpable femoral areteries bilaterally, R PT/DP not palpable due to significant edema, R erythema and edema of first and second toes, and heel, venous stasis dermatitis of right medial leg, LLE unremarkable no wounds no skin changes no edema.   ankle edema of the right ankle   venous stasis of the right leg   Musculoskeletal: sensation and motor grossly intact in all extremities.

## 2024-04-29 ENCOUNTER — APPOINTMENT (OUTPATIENT)
Dept: VASCULAR SURGERY | Facility: CLINIC | Age: 74
End: 2024-04-29
Payer: MEDICARE

## 2024-04-29 ENCOUNTER — APPOINTMENT (OUTPATIENT)
Dept: DERMATOLOGY | Facility: CLINIC | Age: 74
End: 2024-04-29
Payer: MEDICARE

## 2024-04-29 VITALS
HEIGHT: 69 IN | DIASTOLIC BLOOD PRESSURE: 72 MMHG | HEART RATE: 97 BPM | SYSTOLIC BLOOD PRESSURE: 112 MMHG | WEIGHT: 172 LBS | BODY MASS INDEX: 25.48 KG/M2

## 2024-04-29 DIAGNOSIS — L97.919 NON-PRESSURE CHRONIC ULCER OF UNSPECIFIED PART OF RIGHT LOWER LEG WITH UNSPECIFIED SEVERITY: ICD-10-CM

## 2024-04-29 PROCEDURE — 99213 OFFICE O/P EST LOW 20 MIN: CPT | Mod: 25

## 2024-04-29 PROCEDURE — 99204 OFFICE O/P NEW MOD 45 MIN: CPT

## 2024-04-29 PROCEDURE — 29580 STRAPPING UNNA BOOT: CPT | Mod: RT

## 2024-04-29 RX ORDER — TRIAMCINOLONE ACETONIDE 1 MG/G
0.1 OINTMENT TOPICAL
Qty: 454 | Refills: 1 | Status: ACTIVE | COMMUNITY
Start: 2024-04-29 | End: 1900-01-01

## 2024-04-29 RX ORDER — KETOCONAZOLE 20 MG/G
2 CREAM TOPICAL
Qty: 1 | Refills: 2 | Status: ACTIVE | COMMUNITY
Start: 2024-04-29 | End: 1900-01-01

## 2024-04-29 NOTE — PHYSICAL EXAM
[de-identified] : well appearing.   Cardiovascular: normal rate and rhythm. Femoral: right 2+ and left 2+. Palpable femoral areteries bilaterally, R PT/DP not palpable due to significant edema, R erythema and edema of first and second toes, and heel, venous stasis dermatitis of right medial leg, LLE unremarkable no wounds no skin changes no edema.   ankle edema of the right ankle   venous stasis of the right leg   Musculoskeletal: sensation and motor grossly intact in all extremities.

## 2024-04-29 NOTE — HISTORY OF PRESENT ILLNESS
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 He reports remote history of trauma to right heel which required local wound care by his podiatrist which eventually resolved, and similar history of right toe wound requiring local wound care by podiatry which eventually resolved. Patient now presents with right foot pain, erythema, and swelling. This first began back in June of this year at which point he noticed right foot pain while gardening, associated with weeping edema from R foot/ankle. Since then he has been intermittently seen by podiatry, completed a course of mupirocin/doxycycline, and has had intermittent local wound care. He was last seen by Podiatry two weeks ago at which point local wound care was provided and he was referred to Vascular surgery. The pain is localized to the R foot and ankle, with no aggravating or alleviating factors. He denies fevers, chills. He previously wore compression stockings but is now unable to due to the extent of the edema.  [de-identified] : 1/26/24: pt for fu.  he had his bandage changed at podiatry earlier in the week.  pt notices improvement  2/2/24.  increased  drainage since last week.  saturated through dressing  2/9/24: improved appearance, improved odor  2/12/24: patient presents for RLE Unna boot dressing change. Reports decreased ulcer size of posterior calf and anterior shin. Reports skin over toes is more edematous and with drainage. He reports that dressing soaked through yesterday.  2/16/24: reports that drainage much improved  2/23/24: heel is bothering him, somewhat increased drainage  2/26/24: presents for RLE unna boot dressing change. No new complaints. Wounds and drainage are improving.  3/6/2024: RLE wounds are healing nicely. Reports xtrasorb pad was heavily saturated couple of days ago. Minimal drainage during today's visit. No new wounds.  3/8/24: drainage persists, overall improved  3/15/24: improved odor/drainage  3/20/2024: here for RLE unna boot dressing change. No new complaints. Drainage is getting better.  3/25/2024: here for unna boot dressing change. Overall stable. Still draining but better. No new wounds.  3/27/2024 presents for RLE unna boot dressing change. Stable. No new complaints. Less drainage.  3/29/2024: presents for RLE unna boot dressing change. Increased drainage with saturation of dressing. Wound does not appear to be improving with compression alone.   Interval History: 4/15/24: Patient presents for RLE Unna boot dressing change. He reports decreased erythema and edema. He reports continued drainage, particularly from posterior right leg.  4/19: for unna change.  decreased drainage  4/23/24: here for RLE unna boot change. drainage stable. does not elevate his legs frequently. no new wounds or ulcerations. Has not seen derm for eval   4/26/24: here for unna boot change. drainage persists. states there's more drainage over the past couple of days. no adequate rest break to elevate his legs. scheduled to see derm next Monday 4/29/24 here for unna boot change. Saw dermatologist earlier and was prescribed ketoconazole and triamcinolone. Haven't picked up meds yet. RLE still draining. Venous congestion in RLE. Does not elevate his legs frequently. Denies increased pain or warmth. Denies fever or chills.

## 2024-04-29 NOTE — ASSESSMENT
[FreeTextEntry1] : Impression - venous insufficiency with slow to heal RLE wounds with drainage   Plan Conservative medical management - leg elevation, exercise regimen, moisturize skin, calf muscle exercises,  LLE knee high compression stockings, transition to RLE knee high 20-30 mm hg compression stockings once wounds are healed.  Emphasized leg elevation as much as possible Continue to follow up with derm Will consider SCD pump Return to office this Friday for RLE unna boot dressing change.    [Arterial/Venous Disease] : arterial/venous disease [Other: _____] : [unfilled] [Ulcer Care] : ulcer care

## 2024-05-03 ENCOUNTER — APPOINTMENT (OUTPATIENT)
Dept: VASCULAR SURGERY | Facility: CLINIC | Age: 74
End: 2024-05-03
Payer: MEDICARE

## 2024-05-03 VITALS
BODY MASS INDEX: 25.48 KG/M2 | HEART RATE: 80 BPM | WEIGHT: 172 LBS | DIASTOLIC BLOOD PRESSURE: 75 MMHG | TEMPERATURE: 97 F | SYSTOLIC BLOOD PRESSURE: 164 MMHG | HEIGHT: 69 IN

## 2024-05-03 PROCEDURE — 99214 OFFICE O/P EST MOD 30 MIN: CPT

## 2024-05-03 NOTE — ASSESSMENT
[FreeTextEntry1] : 73-year-old male with CEAP 6 venous insufficiency. Draining wound of RLE. Presents for regular dressing changes and evaluation. Slow gradual improvement of wound.  Plan: - Transition from unna boot to daily multilayer compression therapy - Apply ketoconazole to wound with each dressing change - Patient instructed on how to perform daily dressing changes - RTO in 1 week for repeat evaluation and dressing change

## 2024-05-03 NOTE — HISTORY OF PRESENT ILLNESS
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 He reports remote history of trauma to right heel which required local wound care by his podiatrist which eventually resolved, and similar history of right toe wound requiring local wound care by podiatry which eventually resolved. Patient now presents with right foot pain, erythema, and swelling. This first began back in June of this year at which point he noticed right foot pain while gardening, associated with weeping edema from R foot/ankle. Since then he has been intermittently seen by podiatry, completed a course of mupirocin/doxycycline, and has had intermittent local wound care. He was last seen by Podiatry two weeks ago at which point local wound care was provided and he was referred to Vascular surgery. The pain is localized to the R foot and ankle, with no aggravating or alleviating factors. He denies fevers, chills. He previously wore compression stockings but is now unable to due to the extent of the edema.   Interval History: 1/26/24: pt for fu. he had his bandage changed at podiatry earlier in the week. pt notices improvement  2/2/24. increased drainage since last week. saturated through dressing  2/9/24: improved appearance, improved odor  2/12/24: patient presents for RLE Unna boot dressing change. Reports decreased ulcer size of posterior calf and anterior shin. Reports skin over toes is more edematous and with drainage. He reports that dressing soaked through yesterday.  2/16/24: reports that drainage much improved  2/23/24: heel is bothering him, somewhat increased drainage  2/26/24: presents for RLE unna boot dressing change. No new complaints. Wounds and drainage are improving.  3/6/2024: RLE wounds are healing nicely. Reports xtrasorb pad was heavily saturated couple of days ago. Minimal drainage during today's visit. No new wounds.  3/8/24: drainage persists, overall improved  3/15/24: improved odor/drainage  3/20/2024: here for RLE unna boot dressing change. No new complaints. Drainage is getting better.  3/25/2024: here for unna boot dressing change. Overall stable. Still draining but better. No new wounds.  3/27/2024 presents for RLE unna boot dressing change. Stable. No new complaints. Less drainage.  3/29/2024: presents for RLE unna boot dressing change. Increased drainage with saturation of dressing. Wound does not appear to be improving with compression alone.  Interval History: 4/15/24: Patient presents for RLE Unna boot dressing change. He reports decreased erythema and edema. He reports continued drainage, particularly from posterior right leg.  4/19: for unna change. decreased drainage  4/23/24: here for RLE unna boot change. drainage stable. does not elevate his legs frequently. no new wounds or ulcerations. Has not seen derm for eval  4/26/24: here for unna boot change. drainage persists. states there's more drainage over the past couple of days. no adequate rest break to elevate his legs. scheduled to see derm next Monday 4/29/24 here for unna boot change. Saw dermatologist earlier and was prescribed ketoconazole and triamcinolone. Haven't picked up meds yet. RLE still draining. Venous congestion in RLE. Does not elevate his legs frequently. Denies increased pain or warmth. Denies fever or chills.   5/3/2024: Presents for wound evaluation and dressing change. Has not been using ketoconazole or triamcinolone since he has picked it up. RLE is still draining. Swelling and erythema present. Denies increase pain, warm, fevers, and chills.

## 2024-05-03 NOTE — PHYSICAL EXAM
[de-identified] : NAD [de-identified] : Nonlabored breathing on room air [de-identified] : Regular rate [FreeTextEntry1] : R PT/DP not palpable due to significant edema, R erythema and edema of first and second toes, and heel, venous stasis dermatitis of right medial leg, LLE unremarkable no wounds no skin changes no edema.

## 2024-05-03 NOTE — END OF VISIT
[] : Resident [FreeTextEntry3] : given limited response to conventional unna therapy, plan daily antifungal cream, daily dressing w ace 1 wk fu [Time Spent: ___ minutes] : I have spent [unfilled] minutes of time on the encounter.

## 2024-05-06 NOTE — PHYSICAL EXAM
[FreeTextEntry3] : edematous RLE with scaly weepy and crusted red plaque, circumferential, plantar scale and webspace maceration

## 2024-05-06 NOTE — HISTORY OF PRESENT ILLNESS
[FreeTextEntry1] : np [de-identified] : 72 yo M with longstanding venous insufficiency and chronic DVT of the RLE here for skin rash/breakdown. has been diagnosed with cellulitis many times. Says he is not a candidate for a venous ablation, has been wearing unna boots with significant weepy edema and rash of the RLE. On warfarin. Also on trental 400mg TID.

## 2024-05-06 NOTE — ASSESSMENT
[FreeTextEntry1] : #venous stasis with severe overlying dermatitis, likely due to underlying venous insufficiency #tinea pedis ddx also includes ACD to ?unna boot ulcers have healed well, continues to have edema chronic DVT RLE on warfarin also on trental 400mg TID per pt not a candidate for ablation on unna boots - START triamcinolone 0.1% ointment BID to affected area only, no more than 2 weeks, avoid face and groin - r/b/a topical steroids were discussed including but not limited to thinning of the skin, atrophy and dyspigmentation. - ketoconazole cream for feet and in btw toes - can consider patch testing in future - may also consider increasing trental to 800mg TID.

## 2024-05-10 ENCOUNTER — APPOINTMENT (OUTPATIENT)
Dept: VASCULAR SURGERY | Facility: CLINIC | Age: 74
End: 2024-05-10
Payer: MEDICARE

## 2024-05-10 VITALS
HEART RATE: 69 BPM | SYSTOLIC BLOOD PRESSURE: 133 MMHG | TEMPERATURE: 97.9 F | DIASTOLIC BLOOD PRESSURE: 75 MMHG | WEIGHT: 170 LBS | BODY MASS INDEX: 25.18 KG/M2 | HEIGHT: 69 IN

## 2024-05-10 PROCEDURE — 99214 OFFICE O/P EST MOD 30 MIN: CPT

## 2024-05-10 NOTE — PHYSICAL EXAM
[Respiratory Effort] : normal respiratory effort [1+] : left 1+ [] : present [Ankle Swelling Bilaterally] : severe [Ankle Swelling (On Exam)] : not present [Varicose Veins Of Lower Extremities] : not present [de-identified] : NAD

## 2024-05-10 NOTE — HISTORY OF PRESENT ILLNESS
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  Interval History: 1/26/24: pt for fu. he had his bandage changed at podiatry earlier in the week. pt notices improvement  2/2/24. increased drainage since last week. saturated through dressing  2/9/24: improved appearance, improved odor  2/12/24: patient presents for RLE Unna boot dressing change. Reports decreased ulcer size of posterior calf and anterior shin. Reports skin over toes is more edematous and with drainage. He reports that dressing soaked through yesterday.  2/16/24: reports that drainage much improved  2/23/24: heel is bothering him, somewhat increased drainage  2/26/24: presents for RLE unna boot dressing change. No new complaints. Wounds and drainage are improving.  3/6/2024: RLE wounds are healing nicely. Reports xtrasorb pad was heavily saturated couple of days ago. Minimal drainage during today's visit. No new wounds.  3/8/24: drainage persists, overall improved  3/15/24: improved odor/drainage  3/20/2024: here for RLE unna boot dressing change. No new complaints. Drainage is getting better.  3/25/2024: here for unna boot dressing change. Overall stable. Still draining but better. No new wounds.  3/27/2024 presents for RLE unna boot dressing change. Stable. No new complaints. Less drainage.  3/29/2024: presents for RLE unna boot dressing change. Increased drainage with saturation of dressing. Wound does not appear to be improving with compression alone.  Interval History: 4/15/24: Patient presents for RLE Unna boot dressing change. He reports decreased erythema and edema. He reports continued drainage, particularly from posterior right leg.  4/19: for unna change. decreased drainage  4/23/24: here for RLE unna boot change. drainage stable. does not elevate his legs frequently. no new wounds or ulcerations. Has not seen derm for eval  4/26/24: here for unna boot change. drainage persists. states there's more drainage over the past couple of days. no adequate rest break to elevate his legs. scheduled to see derm next Monday 4/29/24 here for unna boot change. Saw dermatologist earlier and was prescribed ketoconazole and triamcinolone. Haven't picked up meds yet. RLE still draining. Venous congestion in RLE. Does not elevate his legs frequently. Denies increased pain or warmth. Denies fever or chills.  5/3/2024: Presents for wound evaluation and dressing change. Has not been using ketoconazole or triamcinolone since he has picked it up. RLE is still draining. Swelling and erythema present. Denies increase pain, warm, fevers, and chills.  5/10/24:  ketoconazole or triamcinolone with daily dressing changed over past week. Reports some improvement in wounds.

## 2024-05-10 NOTE — ASSESSMENT
[FreeTextEntry1] : 73-year-old male with CEAP 6 venous insufficiency. Draining wound of RLE. Presents for regular dressing changes and evaluation. Slow gradual improvement of wound.  Plan:  - Continue with ketoconazole to wound with each dressing change - RTO in 1 week for repeat evaluation and dressing change.

## 2024-05-14 ENCOUNTER — APPOINTMENT (OUTPATIENT)
Dept: DERMATOLOGY | Facility: CLINIC | Age: 74
End: 2024-05-14
Payer: MEDICARE

## 2024-05-14 PROCEDURE — 99214 OFFICE O/P EST MOD 30 MIN: CPT

## 2024-05-14 RX ORDER — HALOBETASOL PROPIONATE 0.5 MG/G
0.05 OINTMENT TOPICAL
Qty: 1 | Refills: 2 | Status: ACTIVE | COMMUNITY
Start: 2024-05-14 | End: 1900-01-01

## 2024-05-17 ENCOUNTER — APPOINTMENT (OUTPATIENT)
Dept: VASCULAR SURGERY | Facility: CLINIC | Age: 74
End: 2024-05-17
Payer: MEDICARE

## 2024-05-17 VITALS
HEART RATE: 76 BPM | SYSTOLIC BLOOD PRESSURE: 121 MMHG | HEIGHT: 69 IN | WEIGHT: 170 LBS | DIASTOLIC BLOOD PRESSURE: 73 MMHG | TEMPERATURE: 97.9 F | BODY MASS INDEX: 25.18 KG/M2

## 2024-05-17 DIAGNOSIS — I83.019 VARICOSE VEINS OF RIGHT LOWER EXTREMITY WITH ULCER OF UNSPECIFIED SITE: ICD-10-CM

## 2024-05-17 DIAGNOSIS — L97.919 VARICOSE VEINS OF RIGHT LOWER EXTREMITY WITH ULCER OF UNSPECIFIED SITE: ICD-10-CM

## 2024-05-17 PROCEDURE — 99212 OFFICE O/P EST SF 10 MIN: CPT

## 2024-05-17 NOTE — ASSESSMENT
[FreeTextEntry1] : #venous stasis with severe overlying dermatitis, likely due to underlying venous insufficiency #tinea pedis ddx also includes ACD to ?unna boot ulcers have healed well, continues to have edema chronic DVT RLE on warfarin also on trental 400mg TID per pt not a candidate for ablation on unna boots - START clobetasol ointment, when he runs out can switch to triamcinolone 0.1% ointment BID to affected area only, no more than 2 weeks, avoid face and groin - r/b/a topical steroids were discussed including but not limited to thinning of the skin, atrophy and dyspigmentation. - ketoconazole cream for feet and in btw toes - can consider patch testing in future - may also consider increasing trental to 800mg TID.

## 2024-05-17 NOTE — HISTORY OF PRESENT ILLNESS
[FreeTextEntry1] : f/u [de-identified] : 72 yo M with longstanding venous insufficiency and chronic DVT of the RLE here for skin rash/breakdown.   LV 4/292/204. Has been using keto cream to feet which he thinks has been helpful. Did not start triamcinolone.   HISTORY: has been diagnosed with cellulitis many times. Says he is not a candidate for a venous ablation, has been wearing unna boots with significant weepy edema and rash of the RLE. On warfarin. Also on trental 400mg TID.

## 2024-05-18 PROBLEM — I83.019: Status: ACTIVE | Noted: 2024-04-05

## 2024-05-18 NOTE — PHYSICAL EXAM
[Respiratory Effort] : normal respiratory effort [1+] : left 1+ [Ankle Swelling (On Exam)] : present [Ankle Swelling On The Right] : mild [Varicose Veins Of Lower Extremities] : not present [] : present [Ankle Swelling Bilaterally] : severe [de-identified] : NAD

## 2024-05-18 NOTE — HISTORY OF PRESENT ILLNESS
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  Interval History: 1/26/24: pt for fu. he had his bandage changed at podiatry earlier in the week. pt notices improvement  2/2/24. increased drainage since last week. saturated through dressing  2/9/24: improved appearance, improved odor  2/12/24: patient presents for RLE Unna boot dressing change. Reports decreased ulcer size of posterior calf and anterior shin. Reports skin over toes is more edematous and with drainage. He reports that dressing soaked through yesterday.  2/16/24: reports that drainage much improved  2/23/24: heel is bothering him, somewhat increased drainage  2/26/24: presents for RLE unna boot dressing change. No new complaints. Wounds and drainage are improving.  3/6/2024: RLE wounds are healing nicely. Reports xtrasorb pad was heavily saturated couple of days ago. Minimal drainage during today's visit. No new wounds.  3/8/24: drainage persists, overall improved  3/15/24: improved odor/drainage  3/20/2024: here for RLE unna boot dressing change. No new complaints. Drainage is getting better.  3/25/2024: here for unna boot dressing change. Overall stable. Still draining but better. No new wounds.  3/27/2024 presents for RLE unna boot dressing change. Stable. No new complaints. Less drainage.  3/29/2024: presents for RLE unna boot dressing change. Increased drainage with saturation of dressing. Wound does not appear to be improving with compression alone.  Interval History: 4/15/24: Patient presents for RLE Unna boot dressing change. He reports decreased erythema and edema. He reports continued drainage, particularly from posterior right leg.  4/19: for unna change. decreased drainage  4/23/24: here for RLE unna boot change. drainage stable. does not elevate his legs frequently. no new wounds or ulcerations. Has not seen derm for eval  4/26/24: here for unna boot change. drainage persists. states there's more drainage over the past couple of days. no adequate rest break to elevate his legs. scheduled to see derm next Monday 4/29/24 here for unna boot change. Saw dermatologist earlier and was prescribed ketoconazole and triamcinolone. Haven't picked up meds yet. RLE still draining. Venous congestion in RLE. Does not elevate his legs frequently. Denies increased pain or warmth. Denies fever or chills.  5/3/2024: Presents for wound evaluation and dressing change. Has not been using ketoconazole or triamcinolone since he has picked it up. RLE is still draining. Swelling and erythema present. Denies increase pain, warm, fevers, and chills.  5/10/24:  ketoconazole or triamcinolone with daily dressing changed over past week. Reports some improvement in wounds.   5/17/24 here for RLE wound check and dressing change. still draining clear fluid. pt states he using ketoconazole or triamcinolone.

## 2024-05-18 NOTE — ASSESSMENT
[FreeTextEntry1] : 74-year-old male with CEAP 6 venous insufficiency. Draining wound of RLE. Presents for regular dressing changes and evaluation. Slow gradual improvement of wound.  Plan:  - Continue with ketoconazole to wound with each dressing change - RTO in 1 week for repeat evaluation and dressing change. - Dressing supplies ordered through [facility]

## 2024-05-24 ENCOUNTER — APPOINTMENT (OUTPATIENT)
Dept: VASCULAR SURGERY | Facility: CLINIC | Age: 74
End: 2024-05-24
Payer: MEDICARE

## 2024-05-24 VITALS
SYSTOLIC BLOOD PRESSURE: 116 MMHG | BODY MASS INDEX: 25.18 KG/M2 | HEART RATE: 86 BPM | WEIGHT: 170 LBS | DIASTOLIC BLOOD PRESSURE: 72 MMHG | TEMPERATURE: 98.1 F | HEIGHT: 69 IN

## 2024-05-24 DIAGNOSIS — L30.9 DERMATITIS, UNSPECIFIED: ICD-10-CM

## 2024-05-24 PROCEDURE — 99214 OFFICE O/P EST MOD 30 MIN: CPT

## 2024-05-24 NOTE — HISTORY OF PRESENT ILLNESS
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 He reports remote history of trauma to right heel which required local wound care by his podiatrist which eventually resolved, and similar history of right toe wound requiring local wound care by podiatry which eventually resolved. Patient now presents with right foot pain, erythema, and swelling. This first began back in June of this year at which point he noticed right foot pain while gardening, associated with weeping edema from R foot/ankle. Since then he has been intermittently seen by podiatry, completed a course of mupirocin/doxycycline, and has had intermittent local wound care. He was last seen by Podiatry two weeks ago at which point local wound care was provided and he was referred to Vascular surgery. The pain is localized to the R foot and ankle, with no aggravating or alleviating factors. He denies fevers, chills. He previously wore compression stockings but is now unable to due to the extent of the edema.  [de-identified] : 1/26/24: pt for fu.  he had his bandage changed at podiatry earlier in the week.  pt notices improvement  2/2/24.  increased  drainage since last week.  saturated through dressing  2/9/24: improved appearance, improved odor  2/12/24: patient presents for RLE Unna boot dressing change. Reports decreased ulcer size of posterior calf and anterior shin. Reports skin over toes is more edematous and with drainage. He reports that dressing soaked through yesterday.  2/16/24: reports that drainage much improved  2/23/24: heel is bothering him, somewhat increased drainage  2/26/24: presents for RLE unna boot dressing change. No new complaints. Wounds and drainage are improving.  3/6/2024: RLE wounds are healing nicely. Reports xtrasorb pad was heavily saturated couple of days ago. Minimal drainage during today's visit. No new wounds.  3/8/24: drainage persists, overall improved  3/15/24: improved odor/drainage  3/20/2024: here for RLE unna boot dressing change. No new complaints. Drainage is getting better.  3/25/2024: here for unna boot dressing change. Overall stable. Still draining but better. No new wounds.  3/27/2024 presents for RLE unna boot dressing change. Stable. No new complaints. Less drainage.  3/29/2024: presents for RLE unna boot dressing change. Increased drainage with saturation of dressing. Wound does not appear to be improving with compression alone.   Interval History: 4/15/24: Patient presents for RLE Unna boot dressing change. He reports decreased erythema and edema. He reports continued drainage, particularly from posterior right leg.  4/19: for unna change.  decreased drainage 4/23/24: here for RLE unna boot change. drainage stable. does not elevate his legs frequently. no new wounds or ulcerations. Has not seen derm for eval  4/26/24: here for unna boot change. drainage persists. states there's more drainage over the past couple of days. no adequate rest break to elevate his legs. scheduled to see derm next Monday 4/29/24 here for unna boot change. Saw dermatologist earlier and was prescribed ketoconazole and triamcinolone. Haven't picked up meds yet. RLE still draining. Venous congestion in RLE. Does not elevate his legs frequently. Denies increased pain or warmth. Denies fever or chills.  5/3/2024: Presents for wound evaluation and dressing change. Has not been using ketoconazole or triamcinolone since he has picked it up. RLE is still draining. Swelling and erythema present. Denies increase pain, warm, fevers, and chills.  5/10/24: ketoconazole or triamcinolone with daily dressing changed over past week. Reports some improvement in wounds.  5/24/24: states he has not yet tried the triamcinilone.  Thinks drainage initially increased, now somewhat improved

## 2024-05-24 NOTE — PHYSICAL EXAM
[de-identified] : well appearing.   Cardiovascular: normal rate and rhythm. Femoral: right 2+ and left 2+. Palpable femoral areteries bilaterally, R PT/DP not palpable due to significant edema, R erythema and edema of first and second toes, and heel, venous stasis dermatitis of right medial leg, LLE unremarkable no wounds no skin changes no edema.   ankle edema of the right ankle   venous stasis of the right leg   Musculoskeletal: sensation and motor grossly intact in all extremities.

## 2024-05-24 NOTE — ASSESSMENT
[FreeTextEntry1] : statsis, dermatitis, post thrombotic syndrome  cont local wound care.  He did not respond well to unna, now performing daily dressing w derm input  Pt agreed to try the topical steroids.  need to reorder supplies.    fu 1 mo, wound check

## 2024-07-12 ENCOUNTER — APPOINTMENT (OUTPATIENT)
Dept: VASCULAR SURGERY | Facility: CLINIC | Age: 74
End: 2024-07-12
Payer: MEDICARE

## 2024-07-12 VITALS
HEIGHT: 69 IN | WEIGHT: 155 LBS | TEMPERATURE: 97.9 F | DIASTOLIC BLOOD PRESSURE: 73 MMHG | SYSTOLIC BLOOD PRESSURE: 120 MMHG | BODY MASS INDEX: 22.96 KG/M2 | HEART RATE: 92 BPM

## 2024-07-12 DIAGNOSIS — L30.9 DERMATITIS, UNSPECIFIED: ICD-10-CM

## 2024-07-12 PROCEDURE — 99214 OFFICE O/P EST MOD 30 MIN: CPT

## 2024-08-18 ENCOUNTER — NON-APPOINTMENT (OUTPATIENT)
Age: 74
End: 2024-08-18

## 2024-08-19 ENCOUNTER — APPOINTMENT (OUTPATIENT)
Dept: DERMATOLOGY | Facility: CLINIC | Age: 74
End: 2024-08-19
Payer: MEDICARE

## 2024-08-19 VITALS — HEIGHT: 69 IN | BODY MASS INDEX: 22.36 KG/M2 | WEIGHT: 151 LBS

## 2024-08-19 DIAGNOSIS — L30.9 DERMATITIS, UNSPECIFIED: ICD-10-CM

## 2024-08-19 DIAGNOSIS — A49.8 OTHER BACTERIAL INFECTIONS OF UNSPECIFIED SITE: ICD-10-CM

## 2024-08-19 PROCEDURE — G2211 COMPLEX E/M VISIT ADD ON: CPT

## 2024-08-19 PROCEDURE — 99215 OFFICE O/P EST HI 40 MIN: CPT

## 2024-08-21 NOTE — HISTORY OF PRESENT ILLNESS
[FreeTextEntry1] : f/u [de-identified] : 75 yo M with longstanding venous insufficiency and chronic DVT of the RLE here for skin rash/breakdown.   08/19/2024: was using keto up until the last few days but stopped as he didn't think it was helpful. Using halobetasol only to the distal foot now and to the areas that are leaking on the leg. States he isn't on trental. No areas that are itchy. Only active area is distal foot   HISTORY: has been diagnosed with cellulitis many times. Says he is not a candidate for a venous ablation, has been wearing unna boots with significant weepy edema and rash of the RLE. On warfarin. Also on trental 400mg TID.

## 2024-08-21 NOTE — HISTORY OF PRESENT ILLNESS
[FreeTextEntry1] : f/u [de-identified] : 75 yo M with longstanding venous insufficiency and chronic DVT of the RLE here for skin rash/breakdown.   08/19/2024: was using keto up until the last few days but stopped as he didn't think it was helpful. Using halobetasol only to the distal foot now and to the areas that are leaking on the leg. States he isn't on trental. No areas that are itchy. Only active area is distal foot   HISTORY: has been diagnosed with cellulitis many times. Says he is not a candidate for a venous ablation, has been wearing unna boots with significant weepy edema and rash of the RLE. On warfarin. Also on trental 400mg TID.

## 2024-08-21 NOTE — ASSESSMENT
[FreeTextEntry1] : #venous stasis with severe overlying dermatitis, likely due to underlying venous insufficiency- improved #tinea pedis- improved  ddx also includes ACD to ?unna boot Edema improved today  chronic DVT RLE on warfarin prev on trental 400mg TID? - states he isn't taking currently  per pt not a candidate for ablation on unna boots Chronic, stable  - STOP halobetasol unless stasis dermatitis improves - discussed what this will look like , can reuse PRN flare up to 2 wks under occlusion ok. SED - May stop the keto as well   #Pseudomonal nail/ toe web infection  Acute, flaring  - Cotton balls between the toes and let it air out  - START vinegar soak 3/4 cup of white vinegar in 1 galloon of luke warm water daily until improved and then weekly   RTC 2 mo

## 2024-08-21 NOTE — PHYSICAL EXAM
[FreeTextEntry3] : green nails  erythema and swelling of the toes with erosions and granulation tissue  few erosions with granulation tissue on the shin

## 2024-10-23 ENCOUNTER — APPOINTMENT (OUTPATIENT)
Dept: DERMATOLOGY | Facility: CLINIC | Age: 74
End: 2024-10-23
Payer: MEDICARE

## 2024-10-23 DIAGNOSIS — A49.8 OTHER BACTERIAL INFECTIONS OF UNSPECIFIED SITE: ICD-10-CM

## 2024-10-23 DIAGNOSIS — S89.91XS UNSPECIFIED INJURY OF RIGHT LOWER LEG, SEQUELA: ICD-10-CM

## 2024-10-23 PROCEDURE — 99215 OFFICE O/P EST HI 40 MIN: CPT

## 2024-10-23 PROCEDURE — G2211 COMPLEX E/M VISIT ADD ON: CPT

## 2024-10-23 RX ORDER — DIOSMIN COMPLEX NO.1 630 MG
TABLET ORAL
Qty: 42 | Refills: 0 | Status: ACTIVE | COMMUNITY
Start: 2024-10-23 | End: 1900-01-01

## 2025-04-02 ENCOUNTER — OFFICE (OUTPATIENT)
Dept: URBAN - METROPOLITAN AREA CLINIC 109 | Facility: CLINIC | Age: 75
Setting detail: OPHTHALMOLOGY
End: 2025-04-02
Payer: MEDICARE

## 2025-04-02 VITALS — HEIGHT: 60 IN

## 2025-04-02 DIAGNOSIS — H25.12: ICD-10-CM

## 2025-04-02 DIAGNOSIS — H40.1113: ICD-10-CM

## 2025-04-02 PROBLEM — H33.311 RETINAL TEAR; RIGHT EYE: Status: ACTIVE | Noted: 2025-04-02

## 2025-04-02 PROCEDURE — 76514 ECHO EXAM OF EYE THICKNESS: CPT | Performed by: OPHTHALMOLOGY

## 2025-04-02 PROCEDURE — 92020 GONIOSCOPY: CPT | Performed by: OPHTHALMOLOGY

## 2025-04-02 PROCEDURE — 92004 COMPRE OPH EXAM NEW PT 1/>: CPT | Performed by: OPHTHALMOLOGY

## 2025-04-02 PROCEDURE — 92202 OPSCPY EXTND ON/MAC DRAW: CPT | Performed by: OPHTHALMOLOGY

## 2025-04-02 PROCEDURE — 92133 CPTRZD OPH DX IMG PST SGM ON: CPT | Performed by: OPHTHALMOLOGY

## 2025-04-02 ASSESSMENT — PACHYMETRY
OS_CT_CORRECTION: -4
OD_CT_UM: 624
OS_CT_UM: 604
OD_CT_CORRECTION: -6

## 2025-04-02 ASSESSMENT — KERATOMETRY
OD_AXISANGLE_DEGREES: 079
OS_K1POWER_DIOPTERS: 41.75
OD_K2POWER_DIOPTERS: 44.00
OD_K1POWER_DIOPTERS: 43.25
OS_AXISANGLE_DEGREES: 083
OS_K2POWER_DIOPTERS: 43.00

## 2025-04-02 ASSESSMENT — TONOMETRY: OS_IOP_MMHG: 20

## 2025-04-02 ASSESSMENT — REFRACTION_CURRENTRX
OD_CYLINDER: 0.00
OS_VPRISM_DIRECTION: SV
OS_OVR_VA: 20/
OD_AXIS: 0
OD_SPHERE: +2.25
OS_SPHERE: +2.25
OD_OVR_VA: 20/
OS_AXIS: 0
OS_CYLINDER: 0.00
OD_VPRISM_DIRECTION: SV

## 2025-04-02 ASSESSMENT — REFRACTION_AUTOREFRACTION
OD_AXIS: 160
OD_SPHERE: -1.50
OS_CYLINDER: -1.00
OS_SPHERE: -8.75
OS_AXIS: 165
OD_CYLINDER: -0.50

## 2025-04-02 ASSESSMENT — CONFRONTATIONAL VISUAL FIELD TEST (CVF)
OS_FINDINGS: FULL
OD_FINDINGS: FULL

## 2025-04-02 ASSESSMENT — VISUAL ACUITY
OD_BCVA: 20/30+2
OS_BCVA: LP

## 2025-04-14 ENCOUNTER — APPOINTMENT (OUTPATIENT)
Dept: DERMATOLOGY | Facility: CLINIC | Age: 75
End: 2025-04-14
Payer: MEDICARE

## 2025-04-14 PROCEDURE — 99214 OFFICE O/P EST MOD 30 MIN: CPT

## 2025-04-14 PROCEDURE — G2211 COMPLEX E/M VISIT ADD ON: CPT

## 2025-04-14 RX ORDER — UREA 20 %
20 CREAM (GRAM) TOPICAL DAILY
Qty: 1 | Refills: 5 | Status: ACTIVE | COMMUNITY
Start: 2025-04-14 | End: 1900-01-01

## 2025-05-05 ENCOUNTER — OFFICE (OUTPATIENT)
Dept: URBAN - METROPOLITAN AREA CLINIC 109 | Facility: CLINIC | Age: 75
Setting detail: OPHTHALMOLOGY
End: 2025-05-05
Payer: MEDICARE

## 2025-05-05 DIAGNOSIS — H40.1113: ICD-10-CM

## 2025-05-05 DIAGNOSIS — H25.12: ICD-10-CM

## 2025-05-05 PROCEDURE — 92083 EXTENDED VISUAL FIELD XM: CPT | Performed by: OPHTHALMOLOGY

## 2025-05-05 PROCEDURE — 99213 OFFICE O/P EST LOW 20 MIN: CPT | Performed by: OPHTHALMOLOGY

## 2025-05-05 ASSESSMENT — KERATOMETRY
OS_K2POWER_DIOPTERS: 43.00
OD_K2POWER_DIOPTERS: 44.00
OS_AXISANGLE_DEGREES: 083
OD_K1POWER_DIOPTERS: 43.25
OD_AXISANGLE_DEGREES: 079
OS_K1POWER_DIOPTERS: 41.75

## 2025-05-05 ASSESSMENT — REFRACTION_AUTOREFRACTION
OD_AXIS: 174
OS_SPHERE: -9.25
OD_CYLINDER: -0.25
OD_SPHERE: -1.75
OS_CYLINDER: -1.00
OS_AXIS: 154

## 2025-05-05 ASSESSMENT — REFRACTION_CURRENTRX
OD_OVR_VA: 20/
OS_CYLINDER: 0.00
OS_VPRISM_DIRECTION: SV
OD_AXIS: 0
OS_AXIS: 0
OS_SPHERE: +2.25
OD_SPHERE: +2.25
OS_OVR_VA: 20/
OD_CYLINDER: 0.00
OD_VPRISM_DIRECTION: SV

## 2025-05-05 ASSESSMENT — PACHYMETRY
OD_CT_CORRECTION: -3
OS_CT_CORRECTION: -4
OD_CT_UM: 585
OS_CT_UM: 603

## 2025-05-05 ASSESSMENT — VISUAL ACUITY
OS_BCVA: LP
OD_BCVA: 20/30-2

## 2025-05-05 ASSESSMENT — CONFRONTATIONAL VISUAL FIELD TEST (CVF)
OS_FINDINGS: FULL
OD_FINDINGS: FULL

## 2025-05-05 ASSESSMENT — TONOMETRY: OS_IOP_MMHG: 20

## 2025-06-09 ENCOUNTER — OFFICE (OUTPATIENT)
Dept: URBAN - METROPOLITAN AREA CLINIC 109 | Facility: CLINIC | Age: 75
Setting detail: OPHTHALMOLOGY
End: 2025-06-09
Payer: MEDICARE

## 2025-06-09 DIAGNOSIS — H40.1113: ICD-10-CM

## 2025-06-09 PROCEDURE — 99213 OFFICE O/P EST LOW 20 MIN: CPT | Performed by: OPHTHALMOLOGY

## 2025-06-09 ASSESSMENT — REFRACTION_CURRENTRX
OS_OVR_VA: 20/
OD_VPRISM_DIRECTION: SV
OS_VPRISM_DIRECTION: SV
OD_OVR_VA: 20/
OD_SPHERE: +2.25
OD_CYLINDER: 0.00
OS_AXIS: 0
OS_CYLINDER: 0.00
OD_AXIS: 0
OS_SPHERE: +2.25

## 2025-06-09 ASSESSMENT — PACHYMETRY
OD_CT_UM: 585
OD_CT_CORRECTION: -3
OS_CT_UM: 603
OS_CT_CORRECTION: -4

## 2025-06-09 ASSESSMENT — VISUAL ACUITY
OS_BCVA: 20/FC
OD_BCVA: 20/40

## 2025-06-09 ASSESSMENT — KERATOMETRY
OS_AXISANGLE_DEGREES: 083
OS_K2POWER_DIOPTERS: 43.00
OD_K2POWER_DIOPTERS: 44.00
OD_K1POWER_DIOPTERS: 43.25
OD_AXISANGLE_DEGREES: 079
OS_K1POWER_DIOPTERS: 41.75

## 2025-06-09 ASSESSMENT — REFRACTION_AUTOREFRACTION
OS_SPHERE: -9.25
OD_CYLINDER: -0.25
OS_CYLINDER: -1.00
OD_SPHERE: -1.75
OS_AXIS: 154
OD_AXIS: 174

## 2025-06-09 ASSESSMENT — CONFRONTATIONAL VISUAL FIELD TEST (CVF)
OD_FINDINGS: FULL
OS_FINDINGS: FULL

## 2025-07-14 ENCOUNTER — OFFICE (OUTPATIENT)
Dept: URBAN - METROPOLITAN AREA CLINIC 109 | Facility: CLINIC | Age: 75
Setting detail: OPHTHALMOLOGY
End: 2025-07-14
Payer: MEDICARE

## 2025-07-14 DIAGNOSIS — H40.1113: ICD-10-CM

## 2025-07-14 PROCEDURE — 92083 EXTENDED VISUAL FIELD XM: CPT | Performed by: OPHTHALMOLOGY

## 2025-07-14 PROCEDURE — 99213 OFFICE O/P EST LOW 20 MIN: CPT | Performed by: OPHTHALMOLOGY

## 2025-07-14 ASSESSMENT — REFRACTION_CURRENTRX
OD_VPRISM_DIRECTION: SV
OS_VPRISM_DIRECTION: SV
OD_AXIS: 0
OD_CYLINDER: 0.00
OD_OVR_VA: 20/
OS_SPHERE: +2.25
OS_OVR_VA: 20/
OS_CYLINDER: 0.00
OD_SPHERE: +2.25
OS_AXIS: 0

## 2025-07-14 ASSESSMENT — REFRACTION_AUTOREFRACTION
OS_SPHERE: -8.75
OD_AXIS: 146
OD_CYLINDER: -1.50
OS_CYLINDER: -1.00
OS_AXIS: 163
OD_SPHERE: -2.00

## 2025-07-14 ASSESSMENT — KERATOMETRY
OS_AXISANGLE_DEGREES: 085
OD_K2POWER_DIOPTERS: 43.50
OS_K1POWER_DIOPTERS: 42.00
OD_AXISANGLE_DEGREES: 061
OD_K1POWER_DIOPTERS: 42.25
OS_K2POWER_DIOPTERS: 43.00

## 2025-07-14 ASSESSMENT — TONOMETRY
OS_IOP_MMHG: 18
OD_IOP_MMHG: 21

## 2025-07-14 ASSESSMENT — CONFRONTATIONAL VISUAL FIELD TEST (CVF)
OD_FINDINGS: FULL
OS_FINDINGS: FULL

## 2025-07-14 ASSESSMENT — PACHYMETRY
OS_CT_CORRECTION: -4
OS_CT_UM: 603
OD_CT_UM: 585
OD_CT_CORRECTION: -3

## 2025-07-14 ASSESSMENT — VISUAL ACUITY
OD_BCVA: 20/40+2
OS_BCVA: CF